# Patient Record
Sex: FEMALE | Race: WHITE | Employment: OTHER | URBAN - METROPOLITAN AREA
[De-identification: names, ages, dates, MRNs, and addresses within clinical notes are randomized per-mention and may not be internally consistent; named-entity substitution may affect disease eponyms.]

---

## 2020-03-09 ENCOUNTER — HOSPITAL ENCOUNTER (OUTPATIENT)
Dept: ULTRASOUND IMAGING | Age: 75
Discharge: HOME | End: 2020-03-09
Attending: ORTHOPAEDIC SURGERY
Payer: MEDICARE

## 2020-03-09 DIAGNOSIS — R93.5 ABNORMAL FINDINGS ON DIAGNOSTIC IMAGING OF OTHER ABDOMINAL REGIONS, INCLUDING RETROPERITONEUM: ICD-10-CM

## 2020-03-09 DIAGNOSIS — M47.26 OTHER SPONDYLOSIS WITH RADICULOPATHY, LUMBAR REGION: ICD-10-CM

## 2020-03-09 PROCEDURE — 93975 VASCULAR STUDY: CPT

## 2020-03-19 NOTE — PROGRESS NOTES
[facility]  [address] [place] [address], [address], [address] [facility]  [address] [facility], [address] [facility]  [place], [address] [facility]542 [phone] [phone]  Guillermina Leija DO      Patient ID:  Name:  Fozia Machado  MRN:  [id number]  :  1945/74 y.o. Date:  3/24/2020      HISTORY OF PRESENT ILLNESS:  Fozia Machado is a 69 y.o.  postmenopausal female referred by Dr. Jaja Mehta for pelvic mass. Has h/o back pain s/p epidural steroid injection She was seen for workup of groin pain, s/p TVUS which demonstrated a nonspecific soft tissueu mass 1.7cm in the lower uterine segment near the cervix. She c/o burning in vulva. Has seen derm in past but not on any treatment. Also has intermittent anterior thigh pain worse when stanging. She presents today for further management. Denies vaginal bleeding. Pt denies any h/o abnormal paps  Labs:  none      Imaging:  3/10/2020  TVUS  Scanned in media  IMPRESSION:  Nonspecific sort tissue 1.7cm mass present in the lower uterine segment/cervix which is abutting the endometrium. This could represent a low uterine fibroid, or a cervical mass. Tissue sampling may be indicated to r/o malignancy. Right ovary demonstrates normal arterial and venous flow. Left ovary obscured by bowel gas.        ROS:   As above      Patient Active Problem List    Diagnosis Date Noted    Severe obesity ([address] 2020     Past Medical History:   Diagnosis Date    Hypercholesterolemia     Hypertension       Past Surgical History:   Procedure Laterality Date    BREAST SURGERY PROCEDURE UNLISTED Left     lump removal    HX COLONOSCOPY        OB History        2    Para        Term                AB        Living   2       SAB        TAB        Ectopic        Molar        Multiple        Live Births                  Social History     Tobacco Use    Smoking status: Former Smoker    Smokeless tobacco: Never Used   Substance Use Topics  Alcohol use: Yes     Comment: 2 per month/social      Family History   Problem Relation Age of Onset    Cancer Maternal Grandmother         lung      Current Outpatient Medications   Medication Sig    atorvastatin (LIPITOR) 10 mg tablet TAKE 1 TABLET EVERY NIGHT AT BEDTIME    multivitamin (ONE A DAY) tablet Take 1 Tab by mouth daily.  ergocalciferol (ERGOCALCIFEROL) 1,250 mcg (50,000 unit) capsule Take 50,000 Units by mouth every seven (7) days.  clobetasoL (TEMOVATE) 0.05 % topical cream Apply  to affected area nightly. No current facility-administered medications for this visit. Allergies   Allergen Reactions    Latex Rash    Penicillins Hives          OBJECTIVE:    Physical Exam  VITAL SIGNS: Visit Vitals  /65 (BP 1 Location: Left arm, BP Patient Position: Sitting)   Pulse 70   Temp 97.3 °F (36.3 °C) (Oral)   Resp 14   Ht 5' 6.5\" (1.689 m)   Wt 103.9 kg (229 lb)   SpO2 98%   BMI 36.41 kg/m²      GENERAL BREANNA: in no apparent distress and well developed and well nourished   MUSCULOSKEL: no joint tenderness, deformity or swelling   INTEGUMENT:  warm and dry, no rashes or lesions   ABDOMEN . soft, NT, ND, No masses appreciated   EXTREMITIES: extremities normal, atraumatic, no cyanosis or edema   PELVIC: External genitalia: obliteration of normal anatomy with erythema c/w lichen sclerosis  Vaginal: atrophic mucosa  Cervix: normal appearance  Adnexa: normal bimanual exam  Uterus: normal single, nontender   RECTAL: deferred   ROVERTO SURVEY: Cervical, supraclavicular, axillary and inguinal nodes normal.   NEURO: Grossly normal          IMPRESSION/PLAN:  1. LIchen sclerosis   -no need for biopsy at this time   -pt info regarding vulvar hygiene given to patient   -will have her use clobetasol nightly for 3 wks and f/u in office    2. ? Cervical mass   -explained given size it is unlikely to be causing symptoms given normal exam. Will get pelvic MRI to better assess      The total time spent was 60 minutes regarding this patients diagnosis of lichen sclerosis, cervical mass and >50% of this time was spent counseling and coordinating care    [address] [place] [address]  Gynecologic Oncology  3/24/365517:21 PM

## 2020-03-24 ENCOUNTER — TELEPHONE (OUTPATIENT)
Dept: ONCOLOGY | Age: 75
End: 2020-03-24

## 2020-03-24 ENCOUNTER — OFFICE VISIT (OUTPATIENT)
Dept: ONCOLOGY | Age: 75
End: 2020-03-24

## 2020-03-24 VITALS
WEIGHT: 229 LBS | BODY MASS INDEX: 35.94 KG/M2 | RESPIRATION RATE: 14 BRPM | HEIGHT: 67 IN | DIASTOLIC BLOOD PRESSURE: 65 MMHG | OXYGEN SATURATION: 98 % | SYSTOLIC BLOOD PRESSURE: 148 MMHG | HEART RATE: 70 BPM | TEMPERATURE: 97.3 F

## 2020-03-24 DIAGNOSIS — N88.8 CERVICAL MASS: Primary | ICD-10-CM

## 2020-03-24 DIAGNOSIS — E66.01 SEVERE OBESITY (HCC): ICD-10-CM

## 2020-03-24 RX ORDER — CLOBETASOL PROPIONATE 0.5 MG/G
CREAM TOPICAL
Qty: 30 G | Refills: 3 | Status: SHIPPED | OUTPATIENT
Start: 2020-03-24 | End: 2021-11-16 | Stop reason: SDUPTHER

## 2020-03-24 RX ORDER — BISMUTH SUBSALICYLATE 262 MG
1 TABLET,CHEWABLE ORAL DAILY
COMMUNITY

## 2020-03-24 RX ORDER — ERGOCALCIFEROL 1.25 MG/1
50000 CAPSULE ORAL
COMMUNITY
Start: 2019-05-14

## 2020-03-24 RX ORDER — ATORVASTATIN CALCIUM 10 MG/1
TABLET, FILM COATED ORAL
COMMUNITY
Start: 2019-05-14

## 2020-03-24 NOTE — PATIENT INSTRUCTIONS
MRI of the Pelvis: About This Test 
What is it? MRI (magnetic resonance imaging) is a test that uses a magnetic field and pulses of radio wave energy to make pictures of the organs and structures inside the body. An MRI of the pelvis can give the doctor information about a woman's uterus, ovaries, and fallopian tubes. The scan is sometimes used to check a man's prostate and seminal vesicles. It also can check the rectum and anal area. When you have an MRI, you lie on a table and the table moves into the MRI machine. Why is this test done? An MRI of the pelvis can help find problems such as tumors in the ovaries, uterus, prostate, rectum, and anus. It also can be used to look for an anal fistula (a tube-shaped passage from the anal canal to a hole in the skin near the anus) and look for the cause of pelvic pain in women, such as endometriosis. How do you prepare for the test? 
In general, there's nothing you have to do before this test, unless your doctor tells you to. Tell your doctor if you get nervous in tight spaces. You may get a medicine to help you relax. If you think you'll get this medicine, be sure you have someone to take you home. For some pelvic MRI tests, you may be asked to not eat or drink for several hours before the test. 
How is the test done? Before the test 
· You will remove all metal objects, such as hearing aids, dentures, jewelry, watches, and hairpins. · You will take off all or most of your clothes and change into a gown. If you do leave some clothes on, make sure you take everything out of your pockets. · You may have contrast material (dye) put into your arm through a tube called an IV. Contrast material helps doctors see specific organs, blood vessels, and most tumors. During the test 
· You will lie on a table that's part of the MRI scanner. · The table will slide into the space that contains the magnet. · Inside the scanner, you will hear a fan and feel air moving. You may hear tapping, thumping, or snapping noises. You may be given earplugs or headphones to reduce the noise. · You will be asked to hold still during the scan. You may be asked to hold your breath for short periods. · You may be alone in the scanning room. But a technologist will watch through a window and talk with you during the test. 
How is the test done? · You may have contrast material (dye) put into your arm through a tube called an IV. · You will lie on a table that's part of the MRI scanner. · The table will slide into the space that contains the magnet. · Inside the scanner, you will hear a fan and feel air moving. You may hear tapping, thumping, or snapping noises. You may be given earplugs or headphones to reduce the noise. · You will be asked to hold still during the scan. You may be asked to hold your breath for short periods. · You may be alone in the scanning room. But a technologist will watch through a window and talk with you during the test. 
How does having an MRI of the pelvis feel? You won't have pain from the magnetic field or radio waves used for the MRI test. But you may be tired or sore from lying in one position for a long time. If a contrast material is used, you may feel some coolness when it is put into your IV. In rare cases, you may feel: · Tingling in the mouth if you have metal dental fillings. · Warmth in the area being checked. This is normal. Tell the technologist if you have nausea, vomiting, a headache, dizziness, pain, burning, or breathing problems. How long does the test take? The test usually takes 30 to 60 minutes but can take as long as 2 hours. What happens after the test? 
· You will probably be able to go home right away. It depends on the reason for the test. 
· You can go back to your usual activities right away. Follow-up care is a key part of your treatment and safety. Be sure to make and go to all appointments, and call your doctor if you are having problems. It's also a good idea to keep a list of the medicines you take. Ask your doctor when you can expect to have your test results. Where can you learn more? Go to http://alvaro-jose.info/ Enter H105 in the search box to learn more about \"MRI of the Pelvis: About This Test.\" Current as of: December 8, 2019Content Version: 12.4 © 3261-1940 [employer]orporated. Care instructions adapted under license by [employer] (which disclaims liability or warranty for this information). If you have questions about a medical condition or this instruction, always ask your healthcare professional. [address] any warranty or liability for your use of this information.

## 2020-03-24 NOTE — PROGRESS NOTES
Pili Watson is a 69 y.o. female presents in office for new patient visit, referred by Dr. Jackie Bautista. Chief Complaint   Patient presents with    New Patient     groin pain      Pt reports seeing dermatology for clitoris 'turning inward'. Do you have any unusual vaginal bleeding, discharge or irritation? No  Do you have any changes in your bowel movements? Pt c/o occasional constipation. Have you been experiencing nausea or vomiting? No  Have you been experiencing any continuous or worsening abdominal pain? Pt c/o 8/10 groin pain. Any urinary burning? Pt c/o night time urinary frequency. Visit Vitals  /65 (BP 1 Location: Left arm, BP Patient Position: Sitting)   Pulse 70   Temp 97.3 °F (36.3 °C) (Oral)   Resp 14   Ht 5' 6.5\" (1.689 m)   Wt 103.9 kg (229 lb)   SpO2 98%   BMI 36.41 kg/m²         Health Maintenance Due   Topic Date Due    Hepatitis C Screening  1945    Lipid Screen  11/29/1957    Shingrix Vaccine Age 50> (1 of 2) 11/29/1997    Breast Cancer Screen Mammogram  11/29/1997    FOBT Q1Y Age 50-75  11/29/1997    GLAUCOMA SCREENING Q2Y  11/29/2012    Bone Densitometry (Dexa) Screening  11/29/2012    Medicare Yearly Exam  03/19/2020         1. Have you been to the ER, urgent care clinic since your last visit? Hospitalized since your last visit? Pt was seen in [place] [facility] 1 month ago for stomach virus    2. Have you seen or consulted any other health care providers outside of the [address] [place] [address] since your last visit? Include any pap smears or colon screening. No    3 most recent PHQ Screens 3/24/2020   Little interest or pleasure in doing things Not at all   Feeling down, depressed, irritable, or hopeless Not at all   Total Score PHQ 2 0       Fall Risk Assessment, last 12 mths 3/24/2020   Able to walk? Yes   Fall in past 12 months?  No       Learning Assessment 3/24/2020   PRIMARY LEARNER Patient   BARRIERS PRIMARY LEARNER NONE   CO-LEARNER CAREGIVER No   PRIMARY LANGUAGE ENGLISH   LEARNER PREFERENCE PRIMARY DEMONSTRATION   ANSWERED BY Patient   RELATIONSHIP SELF

## 2020-04-03 ENCOUNTER — HOSPITAL ENCOUNTER (OUTPATIENT)
Dept: MRI IMAGING | Age: 75
Discharge: HOME | End: 2020-04-03
Attending: OBSTETRICS & GYNECOLOGY
Payer: MEDICARE

## 2020-04-03 DIAGNOSIS — N88.8 CERVICAL MASS: ICD-10-CM

## 2020-04-03 PROCEDURE — 74011636320 HC RX REV CODE- 636/320: Performed by: OBSTETRICS & GYNECOLOGY

## 2020-04-03 PROCEDURE — A9575 INJ GADOTERATE MEGLUMI 0.1ML: HCPCS | Performed by: OBSTETRICS & GYNECOLOGY

## 2020-04-03 PROCEDURE — 72197 MRI PELVIS W/O & W/DYE: CPT

## 2020-04-03 RX ADMIN — GADOTERATE MEGLUMINE 20 ML: 376.9 INJECTION INTRAVENOUS at 11:46

## 2020-04-14 ENCOUNTER — OFFICE VISIT (OUTPATIENT)
Dept: ONCOLOGY | Age: 75
End: 2020-04-14

## 2020-04-14 VITALS
HEART RATE: 68 BPM | OXYGEN SATURATION: 99 % | HEIGHT: 66 IN | TEMPERATURE: 97.1 F | RESPIRATION RATE: 14 BRPM | SYSTOLIC BLOOD PRESSURE: 153 MMHG | DIASTOLIC BLOOD PRESSURE: 65 MMHG | BODY MASS INDEX: 37.45 KG/M2 | WEIGHT: 233 LBS

## 2020-04-14 DIAGNOSIS — L90.0 LICHEN SCLEROSUS ET ATROPHICUS: Primary | ICD-10-CM

## 2020-04-14 NOTE — LETTER
4/14/20 Patient: Rhea Howard YOB: 1945 Date of Visit: 4/14/2020 Keerthi Kamara MD 
[address] [place] [address] VIA Facsimile: [phone] Dear Keerthi Kamara MD, [facility]. My notes for this consultation are attached. If you have questions, please do not hesitate to call me. I look forward to following your patient along with you. Sincerely, William Fall MD

## 2020-04-14 NOTE — PROGRESS NOTES
Josephine Hamlin is a 69 y.o. female presents in office for follow up for review of results. Patient states has no current pain. Patient level of pain 0 out of 10. Do you have any unusual vaginal bleeding, discharge or irritation? No     Do you have any changes in your bowel movements? No     Have you been experiencing any continuous or worsening abdominal pain? No     .  Visit Vitals  /65 (BP 1 Location: Left arm, BP Patient Position: Sitting)   Pulse 68   Temp 97.1 °F (36.2 °C) (Oral)   Resp 14   Ht 5' 6\" (1.676 m)   Wt 233 lb (105.7 kg)   SpO2 99%   BMI 37.61 kg/m²         Health Maintenance Due   Topic Date Due    Hepatitis C Screening  1945    Lipid Screen  11/29/1957    Shingrix Vaccine Age 50> (1 of 2) 11/29/1997    Breast Cancer Screen Mammogram  11/29/1997    FOBT Q1Y Age 50-75  11/29/1997    GLAUCOMA SCREENING Q2Y  11/29/2012    Bone Densitometry (Dexa) Screening  11/29/2012    Medicare Yearly Exam  03/19/2020         1. Have you been to the ER, urgent care clinic since your last visit? Hospitalized since your last visit? March 2020 Virus    2. Have you seen or consulted any other health care providers outside of the [address] [place] [address] since your last visit? Include any pap smears or colon screening. No     3 most recent PHQ Screens 3/24/2020   Little interest or pleasure in doing things Not at all   Feeling down, depressed, irritable, or hopeless Not at all   Total Score PHQ 2 0       No flowsheet data found. Fall Risk Assessment, last 12 mths 3/24/2020   Able to walk? Yes   Fall in past 12 months?  No       Learning Assessment 3/24/2020   PRIMARY LEARNER Patient   BARRIERS PRIMARY LEARNER NONE   CO-LEARNER CAREGIVER No   PRIMARY LANGUAGE ENGLISH   LEARNER PREFERENCE PRIMARY DEMONSTRATION   ANSWERED BY Patient   RELATIONSHIP SELF

## 2020-10-05 ENCOUNTER — TELEPHONE (OUTPATIENT)
Dept: ONCOLOGY | Age: 75
End: 2020-10-05

## 2020-10-05 ENCOUNTER — OFFICE VISIT (OUTPATIENT)
Dept: ONCOLOGY | Age: 75
End: 2020-10-05
Payer: MEDICARE

## 2020-10-05 VITALS
HEIGHT: 66 IN | TEMPERATURE: 97.8 F | WEIGHT: 236 LBS | RESPIRATION RATE: 16 BRPM | HEART RATE: 73 BPM | SYSTOLIC BLOOD PRESSURE: 144 MMHG | DIASTOLIC BLOOD PRESSURE: 65 MMHG | BODY MASS INDEX: 37.93 KG/M2 | OXYGEN SATURATION: 94 %

## 2020-10-05 DIAGNOSIS — L90.0 LICHEN SCLEROSUS ET ATROPHICUS: Primary | ICD-10-CM

## 2020-10-05 DIAGNOSIS — R10.2 VULVOVAGINAL PAIN: ICD-10-CM

## 2020-10-05 PROCEDURE — G8432 DEP SCR NOT DOC, RNG: HCPCS | Performed by: OBSTETRICS & GYNECOLOGY

## 2020-10-05 PROCEDURE — 3017F COLORECTAL CA SCREEN DOC REV: CPT | Performed by: OBSTETRICS & GYNECOLOGY

## 2020-10-05 PROCEDURE — G8400 PT W/DXA NO RESULTS DOC: HCPCS | Performed by: OBSTETRICS & GYNECOLOGY

## 2020-10-05 PROCEDURE — 1101F PT FALLS ASSESS-DOCD LE1/YR: CPT | Performed by: OBSTETRICS & GYNECOLOGY

## 2020-10-05 PROCEDURE — G0463 HOSPITAL OUTPT CLINIC VISIT: HCPCS | Performed by: OBSTETRICS & GYNECOLOGY

## 2020-10-05 PROCEDURE — G8427 DOCREV CUR MEDS BY ELIG CLIN: HCPCS | Performed by: OBSTETRICS & GYNECOLOGY

## 2020-10-05 PROCEDURE — 99214 OFFICE O/P EST MOD 30 MIN: CPT | Performed by: OBSTETRICS & GYNECOLOGY

## 2020-10-05 PROCEDURE — G8536 NO DOC ELDER MAL SCRN: HCPCS | Performed by: OBSTETRICS & GYNECOLOGY

## 2020-10-05 PROCEDURE — 1090F PRES/ABSN URINE INCON ASSESS: CPT | Performed by: OBSTETRICS & GYNECOLOGY

## 2020-10-05 PROCEDURE — G8417 CALC BMI ABV UP PARAM F/U: HCPCS | Performed by: OBSTETRICS & GYNECOLOGY

## 2020-10-05 RX ORDER — SENNOSIDES 25 MG/1
TABLET, FILM COATED ORAL
Qty: 45 G | Refills: 2 | Status: SHIPPED | OUTPATIENT
Start: 2020-10-05

## 2020-10-05 RX ORDER — FLUCONAZOLE 150 MG/1
150 TABLET ORAL DAILY
Qty: 1 TAB | Refills: 0 | Status: SHIPPED | OUTPATIENT
Start: 2020-10-05 | End: 2020-10-06

## 2020-10-05 NOTE — LETTER
10/5/20 Patient: Percy Thornton YOB: 1945 Date of Visit: 10/5/2020 Erika Leonardo MD 
[address]  [place] [address] VIA Facsimile: [phone] Dear Erika Leonardo MD, Thank you for referring Ms. Percy Thornton to [facility] for evaluation. My notes for this consultation are attached. If you have questions, please do not hesitate to call me. I look forward to following your patient along with you. Sincerely, Ford Brantley MD

## 2020-10-05 NOTE — PROGRESS NOTES
Jin Soto is a 69 y.o. female presents in office for Lichen Sclerosus surveillance. Chief Complaint   Patient presents with    Follow-up     Lichen Sclerosus        Do you have any unusual vaginal bleeding, discharge or irritation? No  Do you have any changes in your bowel movements? No  Have you been experiencing nausea or vomiting? No  Have you been experiencing any continuous or worsening abdominal pain? Pt c/o burning and aching pain in pelvic area at night, worsens if she stands a lot. Any urinary burning? No    Visit Vitals  BP (!) 144/65 (BP 1 Location: Left arm, BP Patient Position: Sitting)   Pulse 73   Temp 97.8 °F (36.6 °C) (Oral)   Resp 16   Ht 5' 6\" (1.676 m)   Wt 107 kg (236 lb)   SpO2 94%   BMI 38.09 kg/m²         1. Have you been to the ER, urgent care clinic since your last visit? Hospitalized since your last visit? No    2. Have you seen or consulted any other health care providers outside of the [address] [place] [address] since your last visit? Include any pap smears or colon screening. No    3 most recent PHQ Screens 3/24/2020   Little interest or pleasure in doing things Not at all   Feeling down, depressed, irritable, or hopeless Not at all   Total Score PHQ 2 0         Fall Risk Assessment, last 12 mths 3/24/2020   Able to walk? Yes   Fall in past 12 months?  No       Learning Assessment 3/24/2020   PRIMARY LEARNER Patient   BARRIERS PRIMARY LEARNER NONE   CO-LEARNER CAREGIVER No   PRIMARY LANGUAGE ENGLISH   LEARNER PREFERENCE PRIMARY DEMONSTRATION   ANSWERED BY Patient   RELATIONSHIP SELF

## 2020-10-05 NOTE — PATIENT INSTRUCTIONS
Lichen Sclerosus: Care Instructions Your Care Instructions Lichen sclerosus is a long-term (chronic) skin problem that causes thin, wrinkled white patches. The patches are itchy and painful. If the skin tears, bright red or purple spots may appear. In most cases, it occurs on the skin of the anus (the opening where stool leaves the body), the vulva (the area around the vagina), and the tip of the penis in men who haven't been circumcised. Doctors aren't sure what causes lichen sclerosus. It isn't caused by an infection, and it's not contagious. You can't spread it to others. If the skin patches are on the anus, vulva, or penis, they may need to be treated. If these areas aren't treated, the skin can thicken and scar. This can narrow the openings to the vagina and anus. The foreskin over the penis may tighten and shrink. If this happens, going to the bathroom and having sex can be painful. Lichen sclerosus is usually treated with strong prescription cream or ointment. The medicine stops the inflammation, but the scarring of the skin might not completely go away. Men with scarring from advanced cases on the tip of the penis may have surgery to remove the foreskin. Skin patches on any other part of the body usually go away on their own without treatment. You may have a small increased risk of skin cancer on the affected area. Your doctor will examine the skin at least once a year. Follow-up care is a key part of your treatment and safety. Be sure to make and go to all appointments, and call your doctor if you are having problems. It's also a good idea to know your test results and keep a list of the medicines you take. How can you care for yourself at home? · Be safe with medicines. If your doctor prescribed a cream, apply it exactly as directed. Call your doctor if you think you are having a problem with your medicine. · Put cold, wet cloths on the area to reduce itching. · Wear loose-fitting clothes. Avoid nylon and other fabric that holds moisture close to the skin. This may allow an infection to start. · If your doctor told you to use nonprescription moisturizing cream on your skin, read and follow the directions on the label. Care tips for women · Do not douche, unless your doctor tells you to. · Avoid hot baths. Don't use soaps or bath products to wash the area around your vulva. Rinse with water only, and gently pat the area dry. Care tips for men · Keep your penis clean. If you haven't been circumcised, gently pull the foreskin back to wash your penis with warm water. Make sure your penis is dry before you get dressed. When should you call for help? Call your doctor now or seek immediate medical care if: 
  · You have symptoms of infection, such as: 
? Increased pain, swelling, warmth, or redness. ? Red streaks leading from the area. ? Pus draining from the area. ? A fever. Watch closely for changes in your health, and be sure to contact your doctor if: 
  · The affected area grows or changes.  
  · You do not get better as expected. Where can you learn more? Go to http://www.gray.com/ Enter P548 in the search box to learn more about \"Lichen Sclerosus: Care Instructions. \" Current as of: July 2, 2020               Content Version: 12.6 © 5196-7864 [employer]. Care instructions adapted under license by [employer] (which disclaims liability or warranty for this information). If you have questions about a medical condition or this instruction, always ask your healthcare professional. [place] [address] any warranty or liability for your use of this information. Vulvar Pain: Care Instructions Your Care Instructions The vulva is the area around the opening of the vagina.  The cause of vulvar pain (vulvodynia) is not always clear, but it may include inflamed nerves, allergies, skin diseases, diabetes, or infection. You may have pain just in the vulva, or it may reach to the rectal area or legs. Vulvar pain can flare up with activities such as sitting on a bicycle, having sex, or inserting a tampon. Follow-up care is a key part of your treatment and safety. Be sure to make and go to all appointments, and call your doctor if you are having problems. It's also a good idea to know your test results and keep a list of the medicines you take. How can you care for yourself at home? · Take your medicines exactly as prescribed. Call your doctor if you think you are having a problem with your medicine. · Relieve itching with a cold water compress or cool baths. Do not scratch the area. · Wear loose-fitting, cotton clothes. Avoid nylon and other fabric that holds moisture close to the skin. This may allow an infection to start. · Do not douche, unless your doctor tells you to. · Limit exercise that can irritate the vulva, such as bike riding or horseback riding. · Avoid hot baths, and do not use soaps or bath products to wash your vulvar area. Rinse with water only, and gently pat the area dry. When should you call for help? Call your doctor now or seek immediate medical care if: 
  · You have a new or higher fever.  
  · You have unusual vaginal bleeding.  
  · You have new or worse belly or pelvic pain.  
  · You have vaginal discharge that has increased in amount or smells bad. Watch closely for changes in your health, and be sure to contact your doctor if: 
  · You do not get better as expected. Where can you learn more? Go to http://www.gray.com/ Enter 0490 [id number] 81 in the search box to learn more about \"Vulvar Pain: Care Instructions. \" Current as of: November 8, 2019               Content Version: 12.6 © 0841-9724 [employer].   
Care instructions adapted under license by [employer] (which disclaims liability or warranty for this information). If you have questions about a medical condition or this instruction, always ask your healthcare professional. [address] any warranty or liability for your use of this information.

## 2020-10-05 NOTE — PROGRESS NOTES
[address] [facility]  [address] [place] [address], [address], [address] [place]  [address] [facility], [address] [place], [place] [address] Way  [place], [address] [facility]  [phone]  7 [facility]        Patient ID:  Name:  Lulu Tidwell  MRN:  [id number]  :  1945/74 y.o. Date:  10/5/2020      HISTORY OF PRESENT ILLNESS:  Lulu Tidwell is a 69 y.o.  postmenopausal female referred by Dr. Stiven Cross for pelvic mass. Has h/o back pain s/p epidural steroid injection She was seen for workup of groin pain, s/p TVUS which demonstrated a nonspecific soft tissue mass 1.7cm in the lower uterine segment near the cervix, MRI previously reviewed and was negative. Patient has seen derm in past but not on any treatment. Also has intermittent anterior thigh pain worse when standing. She states that vulvovaginal pain is intermittent and has been ongoing for quite some time. She states that clobetasol cream does not help. She denies itching. She is sexually active occasionally, but admits to significant dyspareunia. She describes the pain as burning, throbbing, vibrating pain. Denies alleviating factors, but states that pain is worse at night after increased activity/standing, when she can finally relax. The pain wakes her at night. She denies vaginal discharge or bleeding. She denies personal or family h/o DVT, stroke, breast cancer. Labs:  none      Imaging:  MRI 4/3/2020  FINDINGS:     UTERUS:  Orientation: Retroverted  Size: 8.0 x 3.0 x 5.2 cm  Masses: 2 small calcified fibroids are present which correspond with the  calcifications seen on ultrasound 3/9/2020. Endometrium: Normal.   Junctional zone: Normal.  Cervix: Normal.     ADNEXA:  Unremarkable.     OTHER:  Colonic diverticulosis is present.      ========     IMPRESSION  IMPRESSION:     Cervical mass is not noted.  Focal hyperechoic myometrial area seen on ultrasound  3/9/2023 corresponds with redundant myometrium related to the retroverted  uterus. 3/10/2020  TVUS  Scanned in media  IMPRESSION:  Nonspecific sort tissue 1.7cm mass present in the lower uterine segment/cervix which is abutting the endometrium. This could represent a low uterine fibroid, or a cervical mass. Tissue sampling may be indicated to r/o malignancy. Right ovary demonstrates normal arterial and venous flow. Left ovary obscured by bowel gas. ROS:   As above      Patient Active Problem List    Diagnosis Date Noted    Severe obesity ([address] 2020     Past Medical History:   Diagnosis Date    Hypercholesterolemia     Hypertension       Past Surgical History:   Procedure Laterality Date    BREAST SURGERY PROCEDURE UNLISTED Left     lump removal    HX COLONOSCOPY        OB History        2    Para        Term                AB        Living   2       SAB        TAB        Ectopic        Molar        Multiple        Live Births                  Social History     Tobacco Use    Smoking status: Former Smoker    Smokeless tobacco: Never Used   Substance Use Topics    Alcohol use: Yes     Comment: 2 per month/social      Family History   Problem Relation Age of Onset    Cancer Maternal Grandmother         lung      Current Outpatient Medications   Medication Sig    atorvastatin (LIPITOR) 10 mg tablet TAKE 1 TABLET EVERY NIGHT AT BEDTIME    multivitamin (ONE A DAY) tablet Take 1 Tab by mouth daily.  ergocalciferol (ERGOCALCIFEROL) 1,250 mcg (50,000 unit) capsule Take 50,000 Units by mouth every seven (7) days.  clobetasoL (TEMOVATE) 0.05 % topical cream Apply  to affected area nightly. No current facility-administered medications for this visit.       Allergies   Allergen Reactions    Latex Rash    Penicillins Hives          OBJECTIVE:    Physical Exam  VITAL SIGNS: Visit Vitals  BP (!) 144/65 (BP 1 Location: Left arm, BP Patient Position: Sitting)   Pulse 73   Temp 97.8 °F (36.6 °C) (Oral)   Resp 16 Ht 5' 6\" (1.676 m)   Wt 107 kg (236 lb)   SpO2 94%   BMI 38.09 kg/m²      GENERAL BREANNA: in no apparent distress and well developed and well nourished   MUSCULOSKEL: no joint tenderness, deformity or swelling   INTEGUMENT:  warm and dry, no rashes or lesions   ABDOMEN . soft, NT, ND, No masses appreciated   EXTREMITIES: extremities normal, atraumatic, no cyanosis or edema   PELVIC: External genitalia: obliteration of normal anatomy with erythema c/w lichen sclerosis  Vaginal: atrophic mucosa, inflammed, no discharge, lesions  Cervix: normal appearance  Adnexa: normal bimanual exam  Uterus: normal single, nontender   RECTAL: deferred   ROVERTO SURVEY: Cervical, supraclavicular, axillary and inguinal nodes normal.   NEURO: Grossly normal          IMPRESSION/PLAN:  1. Lichen sclerosis   -no need for biopsy at this time   -pt info regarding vulvar hygiene given to patient   -will plan for continued use of clobetasol 3x/wk   -ok to f/u in 6 months or if symptoms worsen    2. 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 Discussed treatment options, including pelvic floor PT, vaginal estrogen, topical lidocaine PRN. Reviewed recommendations in detail, including dosing and use. Will f/u in 3 months to reassess. Patient to call sooner PRN. May warrant referral to specialist if no improvement, patient expressed understanding.     3. ? Cervical mass   -previously reviewed MRI with no mass just retroverted uterus         The total time spent was 25 minutes regarding this patients diagnosis of lichen sclerosis, cervical mass and >50% of this time was spent counseling and coordinating care    [facility]  Gynecologic Oncology  10/5/701966:21 PM

## 2020-10-27 ENCOUNTER — HOSPITAL ENCOUNTER (OUTPATIENT)
Dept: PHYSICAL THERAPY | Age: 75
End: 2020-10-27
Payer: MEDICARE

## 2020-10-27 ENCOUNTER — HOSPITAL ENCOUNTER (OUTPATIENT)
Dept: PHYSICAL THERAPY | Age: 75
Discharge: HOME | End: 2020-10-27
Payer: MEDICARE

## 2020-10-27 PROCEDURE — 97162 PT EVAL MOD COMPLEX 30 MIN: CPT

## 2020-10-27 PROCEDURE — 97535 SELF CARE MNGMENT TRAINING: CPT

## 2020-10-27 NOTE — PROGRESS NOTES
Physical Therapy Evaluation        Patient Name: Kate Khan  Date:10/27/2020  : 1945  [x]  Patient  Verified  Payor: Grace Orozco / [place] / Product Type: Medicare /    In time:1245  Out time:130  Total Treatment Time (min): 45  Visit #: 1 of 16    Medicare/BCBS Only   Total Timed Codes (min):  10 1:1 Treatment Time:  55       Treatment Area: Other specified conditions associated with female genital organs and menstrual cycle [N94.89]    SUBJECTIVE  Pain Level (0-10 scale): 4/10 vaginal ache/burn  []constant []intermittent []improving []worsening []no change since onset    Any medication changes, allergies to medications, adverse drug reactions, diagnosis change, or new procedure performed?: [x] No    [] Yes (see summary sheet for update)  Subjective functional status/changes:     PLOF: Prior to menopause no vaginal or urinary pain or issues  Limitations to PLOF: Decreased strength, coordination and endurance, poor behavioral and dietary habits for bladder health  Mechanism of Injury: since menopause, around 15-20 years ago  Current symptoms/Complaints: Patient c/o vaginal pain that wakes her up at night several nights/week and randomly throughout the day, describing the sensation as \"if it falls asleep or if you sat on a bike seat too hard, and then aches afterwards. \" Patient reports also having night time UI at night primarily walking to toilet. Reports pain with intercourse for the last couple of years, unable to engage; also pain with annual exams. *Patient expressing that she feels her body \"shifted and felt a clunk\" right after her son committed suicide 22 years ago.   Previous Treatment/Compliance: none  PMHx/Surgical Hx: lichen sclerosis, 2 vaginal deliveries; topical estrogen (has not started)  and Clobetasol   Work Hx: retired  Living Situation: spouse  Pt Goals: Relief of pain and more control over night time urinary issues  Barriers: []pain []financial []time []transportation []other  Motivation: high  Substance use: [x]Alcohol []Tobacco []other:   Cognition: A & O x 4    Other:    Current urinary complaint  leaks with urgency  urge incontinence  at night primarily    Bladder complaint longevity:  3 - 5 years    Bladder symptom progression:  worsened    Frequency of UI: 2 times per night    Pad use:  incontinence pads: 2- 2 per day    Pad wetness when changed:  wet    Daytime urinary frequency:  Every 4+ hour(s) during the day    Nocturia:  2-3x/ night    Patient has failed previous pelvic floor muscle training? [] Yes    [x] No    Bowel function:  Regular BM, 5-7 per week, every other day  Stool Type ([place]): 2  Toileting position/modifications: standard    Fecal Incontinence present? none    Pain complaint:  vaginal pain: deep, opening (interoitus), sharp, stabbing, aching, during intercourse and at night during sleep, intercourse   Vaginal pain waking pt up 3x/week and during the day randomly     Pain scale:  Verbal Analog scale: average daily pain 3, best day 0, worst pain 8    Pain description:  worsens with: patient cannot identify any precipitating factors and intercourse    Diet: \"I love sugar\" and bread    Fluid intake:    Fluid  Amount per day   Water 32-48 oz/day   Caffeine Decaf tea AM, 1 cup   Alcohol Occassional, 4x/month   Diet coke Recently, 1 can/day         Physical Exam Objective Findings:  Due to complexity of subjective report and time constraints, limited objective taken today. PFM assessment will be performed in next 1-2 sessions.     AROM  Lumbar Left Right   Flex Fingertips to toes, slow with min pain in lumbar *c/o UI with stand    Ext WFL, min pain and reports \"stiffness\"    Side bend Decreased 25 % Decreased 25%   Rotation Decreased 25% Decreased 50%, min pain          Strength (MMT):                                            Hip L (1-5) R (1-5)   Hip Flexion 4- 4-   Hip Ext 4 4   Hip ABD 4- 4-   Hip ADD 4 4       Functional Mobility Transfers:       Sit-Stand: 5 times STS without UE support, using Bilat hands on quads for support    Pelvic Floor Assessment  Patient was educated on pelvic floor anatomy, structure, and function and implications for current presentation of s/s. Patient consents to pelvic floor assessment next session. Postural assessment:  sitting with rounded shoulders, hyperextension of cervical spine  stance with hyperlordotic lumbar spine    Range of Motion:  Patient with significant low back ROM restriction and accommodates with postural and gait changes         30 min [x]Eval                  []Re-Eval       15 min Self Care: Review and handout provided on the following: PFM anatomy, structure and function as it pertains to current s/s, complaints and condition. Reviewed expectations for PFPT and POC. Discussed potential of trauma to manifest in lingering dysfunction as it relates to chronic pain; encourage pt to express and release as things come up in session  GOALS until next session: minimum 48 oz water/day  Limit fluid intake 2-3 hours before bed  No diet soda   Rationale:  Increase awareness and understanding of current condition to improve patients ability to independently and effectively attain goals and progress towards long term management of current condition. With   [] TE   [] TA   [] neuro   [] other: Patient Education: [x] Review HEP    [] Progressed/Changed HEP based on:   [] positioning   [] body mechanics   [] transfers   [] heat/ice application    [] other:           Pain Level (0-10 scale) post treatment: 4    ASSESSMENT/Changes in Function: Patient is a 69year old female who presents to Physical Therapy with c/o pelvic pain and urinary incontinence. Patient presents with ROM restrictions and decreased strength.  Patient also presents with s/s consistent with tightness and tenderness of the pelvic floor muscles, and decreased ability to contract, relax, and elongate the pelvic floor muscles; PFM assessment will be performed in next 1-2 sessions. I feel patient will benefit from skilled therapeutic intervention to improve their ability to function at highest level possible. Patient will continue to benefit from skilled PT services to modify and progress therapeutic interventions, address functional mobility deficits, address ROM deficits, address strength deficits, analyze and address soft tissue restrictions, analyze and cue movement patterns, analyze and modify body mechanics/ergonomics and assess and modify postural abnormalities to attain remaining goals.      [x]  See Plan of Care  []  See progress note/recertification  []  See Discharge Summary         Progress towards goals / Updated goals:  See POC    PLAN  []  Upgrade activities as tolerated     [x]  Continue plan of care  []  Update interventions per flow sheet       []  Discharge due to:_  []  Other:_  PFM assessment next session; review coconut oil and diet: inflammation/irritants for pelvic pain    Martin Mott, PT 10/27/2020  12:48 PM

## 2020-10-27 NOTE — PROGRESS NOTES
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 Assessment/ key information: Patient is a 69year old female who presents to Physical Therapy with c/o pelvic pain and urinary incontinence. Patient presents with ROM restrictions and decreased strength. Patient also presents with s/s consistent with tightness and tenderness of the pelvic floor muscles, and decreased ability to contract, relax, and elongate the pelvic floor muscles; PFM assessment will be performed in next 1-2 sessions. I feel patient will benefit from skilled therapeutic intervention to improve their ability to function at highest level possible.      Patient will continue to benefit from skilled PT services to modify and progress therapeutic interventions, address functional mobility deficits, address ROM deficits, address strength deficits, analyze and address soft tissue restrictions, analyze and cue movement patterns, analyze and modify body mechanics/ergonomics and assess and modify postural abnormalities to attain remaining goals.          Evaluation Complexity History MEDIUM  Complexity : 1-2 comorbidities / personal factors will impact the outcome/ POC ; Examination MEDIUM Complexity : 3 Standardized tests and measures addressing body structure, function, activity limitation and / or participation in recreation  ;Presentation MEDIUM Complexity : Evolving with changing characteristics  ; Clinical Decision Making MEDIUM Complexity : FOTO score of 26-74  Overall Complexity Rating: MEDIUM    Problem List: Pelvic pain/dysfunction, Decreased pelvic floor mm awareness, Decreased pelvic floor mm strength and Improper voiding habits    Treatment Plan may include any combination of the following:   Therapeutic exercise, Urge suppression techniques, Neuromuscular re-education, Manual therapy, Physical agent/modality and Patient education  Patient / Family readiness to learn indicated by: asking questions and interest    Persons(s) to be included in education: patient (P)    Barriers to Learning/Limitations: None  Measures taken if barriers to learning: NA    Patient Goal (s): Relief of pain and more control over night time urinary issues    Patient Self Reported Health Status: good    Rehabilitation Potential: good    Short Term Goals: To be accomplished in 4 weeks:  Patient will demonstrate proper dietary/fluid habits and urge suppression strategies that promote bladder and bowel health to aid in management of urinary urgency and incontinence. Status at eval: Patient consuming irriants and unaware urge suppression strategies. Patient will tolerate insertion of medium dilator, static, for 15 minutes with pain less than 4/10  Status at eval: all insertion elicits 7/85 pain, unable to have intercourse    Patient will demonstrate independence with HEP and self-management that promotes pelvic floor relaxation and awareness. Status at Eval: Patient performing no activity for self-care and/or stress/pelvic pain management. Long Term Goals:  To be accomplished in 8 weeks:  Pt will get up during the night 0-1x to urinate signifying age-appropriate nocturia and improved sleeping routine. Eval: 2-3x/ night, always with UUI sometimes having to change pad    Patient will demonstrate improvement of current complaints evidenced by a 50%   improvement in FOTO, PFDI score  Status at Eval: PFDI 25    Frequency / Duration: Patient to be seen 2 times per week for 8 weeks. Patient/ Caregiver education and instruction: Diagnosis, prognosis, Proper Voiding Habits, Diet, Pain Management, Exercises and Bladder Retraining   [x]  Plan of care has been reviewed with PTA    Certification Period: 10/27/2020 - 01/25/2021    Joaquina Holcomb, PT 10/27/2020 12:48 PM    ________________________________________________________________________    I certify that the above Therapy Services are being furnished while the patient is under my care. I agree with the treatment plan and certify that this therapy is necessary.     Physician's Signature:____________________  Date:____________Time:____________    Please sign and return to [facility]  [address], [address] [facility] [place]  Ph [phone]  Fx [phone]

## 2020-10-28 ENCOUNTER — APPOINTMENT (OUTPATIENT)
Dept: PHYSICAL THERAPY | Age: 75
End: 2020-10-28
Payer: MEDICARE

## 2020-11-05 ENCOUNTER — HOSPITAL ENCOUNTER (OUTPATIENT)
Dept: PHYSICAL THERAPY | Age: 75
Discharge: HOME | End: 2020-11-05
Payer: MEDICARE

## 2020-11-05 PROCEDURE — 97140 MANUAL THERAPY 1/> REGIONS: CPT

## 2020-11-05 PROCEDURE — 97535 SELF CARE MNGMENT TRAINING: CPT

## 2020-11-05 PROCEDURE — 97112 NEUROMUSCULAR REEDUCATION: CPT

## 2020-11-05 NOTE — PROGRESS NOTES
PT DAILY TREATMENT NOTE    Patient Name: Aminah Dyson  Date:2020  : 1945  [x]  Patient  Verified  Payor: Cristofer Alvarado / Plan: [place] / Product Type: Medicare /    In time:1245  Out time:130  Total Treatment Time (min): 45  Total Timed Codes (min): 45  1:1 Treatment Time ( [address] only): 45   Visit #: 2 of 16    Treatment Area: Other specified conditions associated with female genital organs and menstrual cycle [N94.89]    SUBJECTIVE  Pain Level (0-10 scale): 7 pelvic pain, burning vaginally  Any medication changes, allergies to medications, adverse drug reactions, diagnosis change, or new procedure performed?: [x] No    [] Yes (see summary sheet for update)  Subjective functional status/changes:   [] No changes reported  Patient reports: compliance with current HEP. States she has increased water and decreased soda; states she had a colonoscopy which \"drained her. \"     OBJECTIVE    15 min Self Care: Thorough review and handout provided on the following: inflammatory food review and suggested to review whole 30 online. Recommendation for pain management: Mr Jaswinder Byrnes (instructions provided) and nightly coconut oil. Discussed/explained lichen sclerosis per patient request and symptom management  Encouraged starting premarin as Rx by referring MD   Rationale:  Increase awareness and understanding of current condition to improve patients ability to independently and effectively attain goals and progress towards long term management of current condition. 15 min Neuromuscular Re-education:  [x]  See flow sheet : PFM contraction and elongation    Rationale: increase ROM, improve coordination and increase proprioception  to improve the patients ability to increase blood flow and decrease PFM tension/tenderness and pain.      15 min Manual Therapy:  Introitus and deep posterior PFM gentle STM   Rationale: decrease pain, increase tissue extensibility and decrease trigger points to improve the patients ability to decrease tone/tenderness and increase blood flow          With   [] TE   [] TA   [] neuro   [] other: Patient Education: [x] Review HEP    [] Progressed/Changed HEP based on:   [] positioning   [] body mechanics   [] transfers   [] heat/ice application    [] other:      Other Objective/Functional Measures: Pelvic Floor Assessment  Patient was educated on pelvic floor anatomy, structure, and function and implications for current presentation of s/s. Patient consents to pelvic floor assessment.        External trigger point/ muscle tenderness:  bilateral adductors  Superficial PFM tenderness/restriction NONE    PFM Screen:    Skin Integrity:  [] Healthy [] Red  [x] Labia Atrophy [] Fragile    Sensation: [x] Intact [] Diminished:    Muscle Bulk: [x] Symmetrical  [] Well-developed [] Atrophied:  []L   []R   []B    Prolapse: [] Cystocele:   [] Rectocele:     Ability to actively bulge: min  Bulge with cough min; bulge min with knack prior to cough    Pelvic floor manual exam: Performed via internal vaginal assessment  female-upon vaginal palpation of the pelvic floor, the following was noted: generalized decreased tissue extensibility with hypersensitivity  Introitus restriction/TTP (reported as hands on a clock): throughout  Mild TTP/restriction: 1-6, 10-11  Moderate TTP/resriction:6-9    Deep PFM Tenderness/Restriction:    Right Left   pubococcygeus mod mod   Ischiococcygeus mod mod   Iliococcygeus mod mod   Obturator Internus mod mod   coccyx NT         **All TTP reported mod with LIGHT pressure, would be severe with moderate pressure and does elicit pain similar to pelvic pain complaints    Pelvic floor MMT  2 - partial elevation  PERF (Performance/Endurance/Repetitions//Flicks): 2/4/7//2      Pelvic muscle release pattern  1 - poor release, no sensation of release       Pain Level (0-10 scale) post treatment: 7    ASSESSMENT/Changes in Function: Patient tolerated today's session well with no c/o pain. Patient demonstrated understanding of today's instruction, education, and recommendations. Patient is progressing appropriately towards goals. Patient will continue to benefit from skilled PT services to modify and progress therapeutic interventions, address functional mobility deficits, address ROM deficits, address strength deficits, analyze and address soft tissue restrictions, analyze and cue movement patterns, analyze and modify body mechanics/ergonomics and assess and modify postural abnormalities to attain remaining goals. [x]  See Plan of Care  []  See progress note/recertification  []  See Discharge Summary         Progress towards goals / Updated goals:  Short Term Goals: To be accomplished in 4 weeks:  Patient will demonstrate proper dietary/fluid habits and urge suppression strategies that promote bladder and bowel health to aid in management of urinary urgency and incontinence. Status at eval: Patient consuming irriants and unaware urge suppression strategies.      Patient will tolerate insertion of medium dilator, static, for 15 minutes with pain less than 4/10  Status at eval: all insertion elicits 1/21 pain, unable to have intercourse     Patient will demonstrate independence with HEP and self-management that promotes pelvic floor relaxation and awareness. Status at Eval: Patient performing no activity for self-care and/or stress/pelvic pain management.      Long Term Goals: To be accomplished in 8 weeks:  Pt will get up during the night 0-1x to urinate signifying age-appropriate nocturia and improved sleeping routine.   Eval: 2-3x/ night, always with UUI sometimes having to change pad     Patient will demonstrate improvement of current complaints evidenced by a 50%   improvement in FOTO, PFDI score  Status at Eval: PFDI 25    PLAN  []  Upgrade activities as tolerated     [x]  Continue plan of care  [x]  Update interventions per flow sheet       []  Discharge due to:_  []  Other:_ PFM downtraining    Jessie Osullivan, PT 11/5/2020  12:47 PM    Future Appointments   Date Time Provider Howard Larsen   11/6/2020  9:30 AM Erin Holcomb, PT [facility]   11/10/2020 12:00 PM Vinh Marcial, [facility] [place] [facility]   11/12/2020  3:00 PM Vinh Marcial, [facility] [place] [facility]   11/16/2020  9:30 AM Erin Vergara, [facility] [place] [facility]   11/18/2020  8:45 AM Erin Vergara, [facility] [place] [facility]   11/24/2020 10:15 AM Vinh Marcial, [facility] [place] [facility]   11/25/2020  8:45 AM Vinh Marcial, [facility] [place] [facility]   11/30/2020 10:30 AM Vinh Marcial [facility] [place] [facility]   1/11/2021 10:00 AM Lata Javed NP [facility] BS AMB

## 2020-11-06 ENCOUNTER — HOSPITAL ENCOUNTER (OUTPATIENT)
Dept: PHYSICAL THERAPY | Age: 75
Discharge: HOME | End: 2020-11-06
Payer: MEDICARE

## 2020-11-06 PROCEDURE — 97110 THERAPEUTIC EXERCISES: CPT

## 2020-11-06 PROCEDURE — 97530 THERAPEUTIC ACTIVITIES: CPT

## 2020-11-06 NOTE — PROGRESS NOTES
PT DAILY TREATMENT NOTE    Patient Name: Bartolo Perez  Date:2020  : 1945  [x]  Patient  Verified  Payor: Zhoufrances [place] / Plan: [place] MEDICARE PART A & B / Product Type: Medicare /    In time:930  Out time:  Total Treatment Time (min): 45  Total Timed Codes (min): 45  1:1 Treatment Time ( [address] only): 45   Visit #: 3 of 16    Treatment Area: Other specified conditions associated with female genital organs and menstrual cycle [N94.89]    SUBJECTIVE  Pain Level (0-10 scale): 5-6 vaginal burning  Any medication changes, allergies to medications, adverse drug reactions, diagnosis change, or new procedure performed?: [x] No    [] Yes (see summary sheet for update)  Subjective functional status/changes:   [] No changes reported  Patient reports: compliance with current HEP. Last night purchased coconut oil and  Shawn Kirby, did not start using last night. Plans to start all meds and management tools this weekend. OBJECTIVE      30 min Therapeutic Exercise:  [x] See flow sheet :    Rationale: increase ROM to improve the patients ability to downtrain PFM and improve functional mobility throughout lumbopelvic girdle      5 min Self Care: Reviewed plan for weekend: being Rx as recommended by MD and Mr Shawn Kirby for pain management  Reviewed rationale for todays intervention to use as pain management tools and daily activity as well as plan for future tx   Rationale:  Increase awareness and understanding of current condition to improve patients ability to independently and effectively attain goals and progress towards long term management of current condition.      15 min Therapeutic Activity:  [x]  See flow sheet : physiological quieting/guided body scan and downtraining for PFM relaxation   Rationale: increase ROM and increase proprioception  to improve the patients ability to decrease tone/tension throughout lumbopelvic region for chronic pain management      With   [] TE   [] TA   [] neuro   [] other: Patient Education: [x] Review HEP    [] Progressed/Changed HEP based on:   [] positioning   [] body mechanics   [] transfers   [] heat/ice application    [] other:        Pain Level (0-10 scale) post treatment: 4    ASSESSMENT/Changes in Function: Patient tolerated today's session well with no c/o pain. Patient demonstrated understanding of today's instruction, education, and recommendations. Patient is progressing appropriately towards goals. Patient will continue to benefit from skilled PT services to modify and progress therapeutic interventions, address functional mobility deficits, address ROM deficits, address strength deficits, analyze and address soft tissue restrictions, analyze and cue movement patterns, analyze and modify body mechanics/ergonomics and assess and modify postural abnormalities to attain remaining goals. [x]  See Plan of Care  []  See progress note/recertification  []  See Discharge Summary         Progress towards goals / Updated goals:  Short Term Goals: To be accomplished in 4 weeks:  Patient will demonstrate proper dietary/fluid habits and urge suppression strategies that promote bladder and bowel health to aid in management of urinary urgency and incontinence. Status at eval: Patient consuming irriants and unaware urge suppression strategies.      Patient will tolerate insertion of medium dilator, static, for 15 minutes with pain less than 4/10  Status at eval: all insertion elicits 1/75 pain, unable to have intercourse     Patient will demonstrate independence with HEP and self-management that promotes pelvic floor relaxation and awareness.    Status at Eval: Patient performing no activity for self-care and/or stress/pelvic pain management.   11/6/2020 intro to physiological quieting, diaphragmatic breathing with PFM elongation and pelvic girdle stretch routine today     Long Term Goals: To be accomplished in 8 weeks:  Pt will get up during the night 0-1x to urinate signifying age-appropriate nocturia and improved sleeping routine.   Eval: 2-3x/ night, always with UUI sometimes having to change pad     Patient will demonstrate improvement of current complaints evidenced by a 50%   improvement in FOTO, PFDI score  Status at Eval: [facility]  []  Upgrade activities as tolerated     [x]  Continue plan of care  [x]  Update interventions per flow sheet       []  Discharge due to:_  []  Other:_  Bladder fitness, urge suppression, dietary irritants next session; biofeedback    Avinash Taylor PT 11/6/2020  9:36 AM    Future Appointments   Date Time Provider Howard Larsen   11/10/2020 12:00 PM Cielo Lu, PT [facility]   11/12/2020  3:00 PM Cielo Lu, [facility]   11/16/2020  9:30 AM Linh Castellon, PT [facility]   11/18/2020  8:45 AM Linh Castellon, PT [facility]   11/24/2020 10:15 AM Cielo Lu [facility]   11/25/2020  8:45 AM Cielo Lu, [facility]   11/30/2020 10:30 AM Zhang Riversidetasha Verma, [facility]   1/11/2021 10:00 AM Efraín Martinez NP [facility] AMB

## 2020-11-10 ENCOUNTER — HOSPITAL ENCOUNTER (OUTPATIENT)
Dept: PHYSICAL THERAPY | Age: 75
Discharge: HOME | End: 2020-11-10
Payer: MEDICARE

## 2020-11-10 PROCEDURE — 97535 SELF CARE MNGMENT TRAINING: CPT

## 2020-11-10 NOTE — PROGRESS NOTES
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 States she started her Rx as recommended and purchased coconut oil as well as items for Mr Pravin Canchola ice pack however has not tried yet. States she is leaking every time with night time voids. OBJECTIVE      45 min Self Care: Thorough review and handout provided on the following: bladder irritants, bladder fitness, and urge suppression  Water goals  Reviewed double void strategies  Diaphragmatic breathing for chronic pain management, ed on finding off-ramp and management tools; increase function goal   Rationale:  Increase awareness and understanding of current condition to improve patients ability to independently and effectively attain goals and progress towards long term management of current condition. With   [] TE   [] TA   [] neuro   [] other: Patient Education: [x] Review HEP    [] Progressed/Changed HEP based on:   [] positioning   [] body mechanics   [] transfers   [] heat/ice application    [] other:        Pain Level (0-10 scale) post treatment: 7/10 vaginal (no apparent reason) ; LBP 10    ASSESSMENT/Changes in Function: Patient tolerated today's session well with no c/o pain. Patient demonstrated understanding of today's instruction, education, and recommendations.  Patient is progressing appropriately towards goals. Patient will continue to benefit from skilled PT services to modify and progress therapeutic interventions, address functional mobility deficits, address ROM deficits, address strength deficits, analyze and address soft tissue restrictions, analyze and cue movement patterns, analyze and modify body mechanics/ergonomics and assess and modify postural abnormalities to attain remaining goals. [x]  See Plan of Care  []  See progress note/recertification  []  See Discharge Summary         Progress towards goals / Updated goals:  Short Term Goals: To be accomplished in 4 weeks:  Patient will demonstrate proper dietary/fluid habits and urge suppression strategies that promote bladder and bowel health to aid in management of urinary urgency and incontinence. Status at eval: Patient consuming irriants and unaware urge suppression strategies. 11/10/2020 introduced today     Patient will tolerate insertion of medium dilator, static, for 15 minutes with pain less than 4/10  Status at eval: all insertion elicits 9/17 pain, unable to have intercourse     Patient will demonstrate independence with HEP and self-management that promotes pelvic floor relaxation and awareness. Status at Eval: Patient performing no activity for self-care and/or stress/pelvic pain management.   11/6/2020 intro to physiological quieting, diaphragmatic breathing with PFM elongation and pelvic girdle stretch routine today     Long Term Goals: To be accomplished in 8 weeks:  Pt will get up during the night 0-1x to urinate signifying age-appropriate nocturia and improved sleeping routine.   Eval: 2-3x/ night, always with UUI sometimes having to change pad     Patient will demonstrate improvement of current complaints evidenced by a 50%   improvement in FOTO, PFDI score  Status at Eval: [address] [place] [address]  []  Upgrade activities as tolerated     [x]  Continue plan of care  [x]  Update interventions per flow sheet       []  Discharge due to:_  []  Other:_  Biofeedback; transfers and transitions with pelvic bracing    Mary Long, PT 11/10/2020  10:18 AM    Future Appointments   Date Time Provider Howard Larsen   11/12/2020  3:00 PM Laina Acosta, [facility]   11/16/2020  9:30 AM Dewar, Leellen Litten, [facility]   11/18/2020  8:45 AM Jodene Anthony, Leellen Litten, [facility]   11/24/2020 10:15 AM Laina Acosta [facility]   11/25/2020  8:45 AM Laina Acosta, [facility]   11/30/2020 10:30 AM Laina Acosta [facility]   1/11/2021 10:00 AM Carla Vo NP [facility] AMB

## 2020-11-12 ENCOUNTER — HOSPITAL ENCOUNTER (OUTPATIENT)
Dept: PHYSICAL THERAPY | Age: 75
Discharge: HOME | End: 2020-11-12
Payer: MEDICARE

## 2020-11-12 PROCEDURE — 97535 SELF CARE MNGMENT TRAINING: CPT

## 2020-11-12 PROCEDURE — 97530 THERAPEUTIC ACTIVITIES: CPT

## 2020-11-12 NOTE — PROGRESS NOTES
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 Patient states she has not had coffee or soda in a few days, only water and progressing towards water goal. States she re-read information last night and notes increase in awareness. States she is noting less UI with transfers in terms of amount of leakage. Per pt report colonoscopy results indicate precancerous polyps, to f/u in 3 years; states GI MD would \"love to hear I got off the sugar\"   Patient reports she has to leave in 30 minutes. OBJECTIVE      20 min Self Care: Thorough review and handout provided on the following: sugar content in fruits  Per patient request reviewed specifics of whole30 and strategies to initiate plus health benefits of cutting/decreasing sugar intake   Rationale:  Increase awareness and understanding of current condition to improve patients ability to independently and effectively attain goals and progress towards long term management of current condition.      10 min Therapeutic Activity:  [x]  See flow sheet : STS with PFMC and exhale   Rationale: increase strength, improve coordination and increase proprioception  to improve the patients ability to transfer with better form and less UI            With   [] TE   [] TA   [] neuro   [] other: Patient Education: [x] Review HEP    [] Progressed/Changed HEP based on:   [] positioning   [] body mechanics   [] transfers   [] heat/ice application    [] other:        Pain Level (0-10 scale) post treatment: 5    ASSESSMENT/Changes in Function: Patient tolerated today's session well with no c/o pain. Patient demonstrated understanding of today's instruction, education, and recommendations. Patient is progressing appropriately towards goals. Patient will continue to benefit from skilled PT services to modify and progress therapeutic interventions, address functional mobility deficits, address ROM deficits, address strength deficits, analyze and address soft tissue restrictions, analyze and cue movement patterns and analyze and modify body mechanics/ergonomics to attain remaining goals. [x]  See Plan of Care  []  See progress note/recertification  []  See Discharge Summary         Progress towards goals / Updated goals:  Short Term Goals: To be accomplished in 4 weeks:  Patient will demonstrate proper dietary/fluid habits and urge suppression strategies that promote bladder and bowel health to aid in management of urinary urgency and incontinence. Status at eval: Patient consuming irriants and unaware urge suppression strategies. 11/10/2020 introduced today     Patient will tolerate insertion of medium dilator, static, for 15 minutes with pain less than 4/10  Status at eval: all insertion elicits 4/44 pain, unable to have intercourse     Patient will demonstrate independence with HEP and self-management that promotes pelvic floor relaxation and awareness.    Status at Eval: Patient performing no activity for self-care and/or stress/pelvic pain management.   11/6/2020 intro to physiological quieting, diaphragmatic breathing with PFM elongation and pelvic girdle stretch routine today     Long Term Goals: To be accomplished in 8 weeks:  Pt will get up during the night 0-1x to urinate signifying age-appropriate nocturia and improved sleeping routine.   Eval: 2-3x/ night, always with UUI sometimes having to change pad     Patient will demonstrate improvement of current complaints evidenced by a 50%   improvement in FOTO, PFDI score  Status at Eval: [facility]  []  Upgrade activities as tolerated     [x]  Continue plan of care  [x]  Update interventions per flow sheet       []  Discharge due to:_  []  Other:_  Biofeedback next session; progress towards pelvic pain goals    Carola Marion PT 11/12/2020  3:08 PM    Future Appointments   Date Time Provider Howard Larsen   11/16/2020  9:30 AM Bindu Holcomb, PT [facility]   11/18/2020  8:45 AM [place] B[place]u Edwards, [facility]   11/24/2020 10:15 AM Arleth Cabrera, [facility]   11/25/2020  8:45 AM Arleth Cabrera, [facility]   11/30/2020 10:30 AM Arleth Cabrera, [facility]   1/11/2021 10:00 AM J Carlos Ospina NP [facility] BS AMB

## 2020-11-16 ENCOUNTER — HOSPITAL ENCOUNTER (OUTPATIENT)
Dept: PHYSICAL THERAPY | Age: 75
Discharge: HOME | End: 2020-11-16
Payer: MEDICARE

## 2020-11-16 PROCEDURE — 97535 SELF CARE MNGMENT TRAINING: CPT

## 2020-11-16 PROCEDURE — 97112 NEUROMUSCULAR REEDUCATION: CPT

## 2020-11-16 NOTE — PROGRESS NOTES
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 States she has been using kegel and \"blow as you go\" with transitions for avoiding GENESIS which has been successful \"one or two nights this week\"     OBJECTIVE      25 min Self Care: Thorough review and handout provided on the following: biofeedback expectations, results, and implications for treatment: PFM HEP 10/15/10 BID AM/PM and s/s monitoring    Rationale:  Increase awareness and understanding of current condition to improve patients ability to independently and effectively attain goals and progress towards long term management of current condition.      20 min Neuromuscular Re-education:  [x]  See flow sheet : via sEMG perianal electrodes  Patient required VC/TC and visual feedback for normal breathing and proper activation of pelvic floor muscles; reviewed in mass practice   Rationale: increase strength, improve coordination and increase proprioception  to improve the patients ability to decrease pain and tension    Biofeedback expectations and implications were reviewed with patient prior to intervention,   Performed sEMG with perianal electrodes as follows:    Position, initial resting tone Work/Rest/Reps Comments   Sidelying (left)   3.0mV 10/10/5  10/10/5  2/4/10 Demo decreased motor recruitment and overall W-R ratio; Patient required rest break and explanation of results for proper form   Difficulty coordinating exhale and kegel with quicks    Resting tone decreased following todays NM re-ed: <2.0 mV   Sitting       Standing                Neuromuscular Re-education was provided with visual, verbal and tactile cueing throughout intervention  Results and implications for treatment and HEP were reviewed in detail with patient during and following today's intervention. With   [] TE   [] TA   [] neuro   [] other: Patient Education: [x] Review HEP    [] Progressed/Changed HEP based on:   [] positioning   [] body mechanics   [] transfers   [] heat/ice application    [] other:         Pain Level (0-10 scale) post treatment: 8    ASSESSMENT/Changes in Function: Patient tolerated today's session well with no c/o pain. Patient demonstrated understanding of today's instruction, education, and recommendations. Patient is progressing appropriately towards goals. Patient will continue to benefit from skilled PT services to modify and progress therapeutic interventions, address functional mobility deficits, address ROM deficits, address strength deficits, analyze and address soft tissue restrictions, analyze and cue movement patterns, analyze and modify body mechanics/ergonomics and assess and modify postural abnormalities to attain remaining goals. [x]  See Plan of Care  []  See progress note/recertification  []  See Discharge Summary         Progress towards goals / Updated goals:  Short Term Goals: To be accomplished in 4 weeks:  Patient will demonstrate proper dietary/fluid habits and urge suppression strategies that promote bladder and bowel health to aid in management of urinary urgency and incontinence.   Status at eval: Patient consuming irriants and unaware urge suppression strategies.   11/10/2020 introduced today     Patient will tolerate insertion of medium dilator, static, for 15 minutes with pain less than 4/10  Status at eval: all insertion elicits 3/00 pain, unable to have intercourse     Patient will demonstrate independence with HEP and self-management that promotes pelvic floor relaxation and awareness. Status at Eval: Patient performing no activity for self-care and/or stress/pelvic pain management.   11/6/2020 intro to physiological quieting, diaphragmatic breathing with PFM elongation and pelvic girdle stretch routine today     Long Term Goals: To be accomplished in 8 weeks:  Pt will get up during the night 0-1x to urinate signifying age-appropriate nocturia and improved sleeping routine.   Eval: 2-3x/ night, always with UUI sometimes having to change pad     Patient will demonstrate improvement of current complaints evidenced by a 50%   improvement in FOTO, PFDI score  Status at Eval: [address] [place] [address]  []  Upgrade activities as tolerated     [x]  Continue plan of care  [x]  Update interventions per flow sheet       []  Discharge due to:_  []  Other:_ progress towards pelvic pain goals    Felipe Gutiérrez, PT 11/16/2020  9:37 AM    Future Appointments   Date Time Provider Howard Larsen   11/18/2020  8:45 AM Shannon Bailey, PT [facility]   11/24/2020 10:15 AM Jayshree Saldaña, PT [facility]   11/25/2020  8:45 AM Jayshree Saldaña, PT [facility]   11/30/2020 10:30 AM Jayshree Saldaña PT [facility]   1/11/2021 10:00 AM Jakub Glover NP [facility]

## 2020-11-18 ENCOUNTER — HOSPITAL ENCOUNTER (OUTPATIENT)
Dept: PHYSICAL THERAPY | Age: 75
Discharge: HOME | End: 2020-11-18
Payer: MEDICARE

## 2020-11-18 PROCEDURE — 97110 THERAPEUTIC EXERCISES: CPT

## 2020-11-18 PROCEDURE — 97535 SELF CARE MNGMENT TRAINING: CPT

## 2020-11-18 NOTE — PROGRESS NOTES
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 Patient states she has not had to change a pad at night; attributes that to strategies during transfers. States she had a \"back spasm\" yesterday trying to get off of exam table at PCP; reports it is still \"flared up pretty bad today\" No soreness following last session. OBJECTIVE      35 min Therapeutic Exercise:  [x] See flow sheet : [facility]   Rationale: increase strength and improve coordination to improve the patients ability to improve muscle activation and decrease LBP while progressing towards core stabilization goals      10 min Self Care: Thorough review rationale for core stabs as it relates to urinary and pelvic pain issues as well as back pain  Ed on benefits of ortho PT for addressing back issues. Rationale:  Increase awareness and understanding of current condition to improve patients ability to independently and effectively attain goals and progress towards long term management of current condition.                With   [] TE   [] TA   [] neuro   [] other: Patient Education: [x] Review HEP    [] Progressed/Changed HEP based on:   [] positioning   [] body mechanics   [] transfers   [] heat/ice application    [] other:         Pain Level (0-10 scale) post treatment: 4/10 LBP    ASSESSMENT/Changes in Function: Patient tolerated today's session well with decreased c/o pain. Patient demonstrated understanding of today's instruction, education, and recommendations. Patient is progressing appropriately towards goals. Patient will continue to benefit from skilled PT services to modify and progress therapeutic interventions, address functional mobility deficits, address ROM deficits, address strength deficits, analyze and address soft tissue restrictions, analyze and cue movement patterns, analyze and modify body mechanics/ergonomics and assess and modify postural abnormalities to attain remaining goals. [x]  See Plan of Care  []  See progress note/recertification  []  See Discharge Summary         Progress towards goals / Updated goals:  Short Term Goals: To be accomplished in 4 weeks:  Patient will demonstrate proper dietary/fluid habits and urge suppression strategies that promote bladder and bowel health to aid in management of urinary urgency and incontinence. Status at eval: Patient consuming irriants and unaware urge suppression strategies.      Patient will tolerate insertion of medium dilator, static, for 15 minutes with pain less than 4/10  Status at eval: all insertion elicits 6/41 pain, unable to have intercourse     Patient will demonstrate independence with HEP and self-management that promotes pelvic floor relaxation and awareness. Status at Eval: Patient performing no activity for self-care and/or stress/pelvic pain management. 11/18/2020 intro so basic core stabilization therex/HEP today     Long Term Goals: To be accomplished in 8 weeks:  Pt will get up during the night 0-1x to urinate signifying age-appropriate nocturia and improved sleeping routine.   Eval: 2-3x/ night, always with UUI sometimes having to change pad     Patient will demonstrate improvement of current complaints evidenced by a 50%   improvement in FOTO, PFDI score  Status at Eval: PFDI 25    PLAN  []  Upgrade activities as tolerated     [x]  Continue plan of care  [x]  Update interventions per flow sheet       []  Discharge due to:_  []  Other:_  PROG 11/25    Norm Holden, PT 11/18/2020  8:48 AM    Future Appointments   Date Time Provider Howard Larsen   11/24/2020 10:15 AM Melida Holcomb, PT [facility]   11/25/2020  8:45 AM Barbara Going, PT [facility]   11/30/2020 10:30 AM Barbara Going, PT [facility]   1/11/2021 10:00 AM CHAYO Parsons BS AMB

## 2020-11-24 ENCOUNTER — APPOINTMENT (OUTPATIENT)
Dept: PHYSICAL THERAPY | Age: 75
End: 2020-11-24
Payer: MEDICARE

## 2020-11-25 ENCOUNTER — APPOINTMENT (OUTPATIENT)
Dept: PHYSICAL THERAPY | Age: 75
End: 2020-11-25
Payer: MEDICARE

## 2020-11-30 ENCOUNTER — APPOINTMENT (OUTPATIENT)
Dept: PHYSICAL THERAPY | Age: 75
End: 2020-11-30
Payer: MEDICARE

## 2020-12-02 ENCOUNTER — HOSPITAL ENCOUNTER (OUTPATIENT)
Dept: PHYSICAL THERAPY | Age: 75
Discharge: HOME | End: 2020-12-02
Payer: MEDICARE

## 2020-12-02 PROCEDURE — 97535 SELF CARE MNGMENT TRAINING: CPT

## 2020-12-02 PROCEDURE — 97140 MANUAL THERAPY 1/> REGIONS: CPT

## 2020-12-02 NOTE — PROGRESS NOTES
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.819] Onset Date:about 20 years ago     Prior Hospitalization: see medical history Provider#: [id number]   Medications: Verified on Patient Summary List    Comorbidities: lichen sclerosis, 2 vaginal deliveries   Prior Level of Function: Prior to menopause approximately 20 years ago no vaginal or urinary pain or issues    Visits from Start of Care: 7    Missed Visits: 0    Established Goals:           Excellent Good         Limited           None  [x] Increase Pelvic MM strength       []  []  [x]  []  [x] Decrease Pelvic MM hypertonus     []  []  []  [x]  [x] Decrease Incontinence Episodes    []  [x]  []  []   [x] Improve Voiding Habits        []  [x]  []  []   [x] Decreased Urgency        []  [x]  []  []  [x] Decrease Pelvic Pain        []  []  [x]  []    Key Functional Changes: Primary complaint currently is pelvic pain and inability to have intercourse  Patient reports overall 30% improvement in urinary s/s since IE. Updated Goals: to be achieved in 8 weeks:   Short Term Goals: To be accomplished in 4 weeks:  Patient will demonstrate proper dietary/fluid habits and urge suppression strategies that promote bladder and bowel health to aid in management of urinary urgency and incontinence. Status at eval: Patient consuming irriants and unaware urge suppression strategies.    20 Issues only present at night time; \"I will have a good spell and then I won't have a good spell\" but still having UUI first thing in the morning when standing up but sometimes able to suppress urge a few minutes longer, about 30% improvement     Patient will tolerate insertion of medium dilator, static, for 15 minutes with pain less than 4/10  Status at eval: all insertion elicits 9/81 pain, unable to have intercourse  11/18/20 have not addressed pelvic pain goals at this time.      Patient will demonstrate independence with HEP and self-management that promotes pelvic floor relaxation and awareness. Status at Eval: Patient performing no activity for self-care and/or stress/pelvic pain management.   11/18/20  compliant with HEP and management tools; decrease in frequency of pain about 5-10%     Long Term Goals: To be accomplished in 8 weeks:  Pt will get up during the night 0-1x to urinate signifying age-appropriate nocturia and improved sleeping routine. Eval: 2-3x/ night, always with UUI sometimes having to change pad  11/18/20 Now urinating 0-2 times/night , more often 1 time/night ; has not had to change pad.      Patient will demonstrate improvement of current complaints evidenced by a 50%   improvement in FOTO, PFDI score  Status at Eval: PFDI 21  11/18/20  PFDI 21, no change     ASSESSMENT/RECOMMENDATIONS:  [x]Continue therapy per initial plan/protocol at a frequency of  2 x per week for 8 weeks  []Continue therapy with the following recommended changes:_____________________      _____________________________________________________________________  []Discontinue therapy progressing towards or have reached established goals  []Discontinue therapy due to lack of appreciable progress towards goals  []Discontinue therapy due to lack of attendance or compliance  []Await Physician's recommendations/decisions regarding therapy  []Other:________________________________________________________________    Thank you for this referral.   Elle Lancaster, PT 12/2/2020 8:50 AM  NOTE TO PHYSICIAN:  [address]   FAX TO [facility]: [phone]  If you are unable to process this request in 24 hours please contact our office: [phone]      ?  I have read the above report and request that my patient continue as recommended. ? I have read the above report and request that my patient continue therapy with the following changes/special instructions:___________________________________________________________  ? I have read the above report and request that my patient be discharged from therapy.

## 2020-12-02 NOTE — PROGRESS NOTES
PT DAILY TREATMENT NOTE    Patient Name: Sharad Gates  Date:2020  : 1945  [x]  Patient  Verified  Payor: Rodolfo Olivares / Plan: [place] MEDICARE PART A & B / Product Type: Medicare /    In [id number]  Out time:930  Total Treatment Time (min): 45  Total Timed Codes (min): 45  1:1 Treatment Time ( [address] only): 39   Visit #: 8 of 16    Treatment Area: Vulvodynia [N94.819]    SUBJECTIVE  Pain Level (0-10 scale): 3 vaginal pain  Any medication changes, allergies to medications, adverse drug reactions, diagnosis change, or new procedure performed?: [x] No    [] Yes (see summary sheet for update)  Subjective functional status/changes:   [] No changes reported  Patient reports:  compliance with current HEP. Patient states she has reduced sugar and notes an improvement in leg pain. Also noting a decrease in pelvic pain frequency. Patient would like to try dropping down to once a week in December. OBJECTIVE    20 min Self Care: Thorough review progress and goals to date; reviewed urinary recommendations and dietary irritants again per patient request.   Reviewed plan for future treatment   Provided with XS dilator for self-management tool for external trigger point release only at this time , no more than 4/10 pain progressing towards 15 minutes   Rationale:  Increase awareness and understanding of current condition to improve patients ability to independently and effectively attain goals and progress towards long term management of current condition.      25 min Manual Therapy:  Skin rolling bilateral proximal LEs   STM/trigger point release bilateral adductors and superficial PFM/perineum, pain 4-6/10   Rationale: decrease pain, increase ROM, increase tissue extensibility, decrease trigger points and increase postural awareness to improve the patients ability to downtrain and desensitize pelvic floor muscles and tension            With   [] TE   [x] TA   [] neuro   [] other: Patient Education: [x] Review HEP    [] Progressed/Changed HEP based on: dilator  [] positioning   [] body mechanics   [] transfers   [] heat/ice application    [] other:          Pain Level (0-10 scale) post treatment: 4    ASSESSMENT/Changes in Function: Patient tolerated today's session well with no c/o pain. Patient demonstrated understanding of today's instruction, education, and recommendations. Patient is progressing appropriately towards goals. Patient will continue to benefit from skilled PT services to modify and progress therapeutic interventions, address functional mobility deficits, address ROM deficits, address strength deficits, analyze and address soft tissue restrictions, analyze and cue movement patterns, analyze and modify body mechanics/ergonomics and assess and modify postural abnormalities to attain remaining goals. [x]  See Plan of Care  []  See progress note/recertification  []  See Discharge Summary         Progress towards goals / Updated goals:    Short Term Goals: To be accomplished in 4 weeks:  Patient will demonstrate proper dietary/fluid habits and urge suppression strategies that promote bladder and bowel health to aid in management of urinary urgency and incontinence. Status at eval: Patient consuming irriants and unaware urge suppression strategies. 11/18/20 Issues only present at night time; \"I will have a good spell and then I won't have a good spell\" but still having UUI first thing in the morning when standing up but sometimes able to suppress urge a few minutes longer, about 30% improvement     Patient will tolerate insertion of medium dilator, static, for 15 minutes with pain less than 4/10  Status at eval: all insertion elicits 6/60 pain, unable to have intercourse  11/18/20 have not addressed pelvic pain goals at this time.      Patient will demonstrate independence with HEP and self-management that promotes pelvic floor relaxation and awareness.    Status at Eval: Patient performing no activity for self-care and/or stress/pelvic pain management.   12/2/2020 introduced self management tools for addressing pelvic pain: XS dilator for external trigger point release     Long Term Goals: To be accomplished in 8 weeks:  Pt will get up during the night 0-1x to urinate signifying age-appropriate nocturia and improved sleeping routine.   Eval: 2-3x/ night, always with UUI sometimes having to change pad  12/2/2020 urinating 0-2 times/night , more often 1 time/night ; has not had to change pad.      Patient will demonstrate improvement of current complaints evidenced by a 50%   improvement in FOTO, PFDI score  Status at Eval: PFDI 21  11/18/20  PFDI 21, no change    PLAN  []  Upgrade activities as tolerated     [x]  Continue plan of care  [x]  Update interventions per flow sheet       []  Discharge due to:_  []  Other:_      Tania Ramos, PT 12/2/2020  8:49 AM    Future Appointments   Date Time Provider Howard Larsen   1/11/2021 10:00 AM CHAYO ReddingS BS AMB

## 2020-12-08 ENCOUNTER — HOSPITAL ENCOUNTER (OUTPATIENT)
Dept: PHYSICAL THERAPY | Age: 75
Discharge: HOME | End: 2020-12-08
Payer: MEDICARE

## 2020-12-08 PROCEDURE — 97112 NEUROMUSCULAR REEDUCATION: CPT

## 2020-12-08 PROCEDURE — 97140 MANUAL THERAPY 1/> REGIONS: CPT

## 2020-12-08 NOTE — PROGRESS NOTES
PT DAILY TREATMENT NOTE    Patient Name: Ismael Montana  Date:2020  : 1945  [x]  Patient  Verified  Payor: Georgie Thaos / Plan: [place] / Product Type: Medicare /    In time:1020  Out time [id number]  Total Treatment Time (min): 40  Total Timed Codes (min): 40  1:1 Treatment Time ([facility] only): 40   Visit #: 9 of 16    Treatment Area: Vulvodynia [N94.819]    SUBJECTIVE  Pain Level (0-10 scale): 0  Any medication changes, allergies to medications, adverse drug reactions, diagnosis change, or new procedure performed?: [x] No    [] Yes (see summary sheet for update)  Subjective functional status/changes:   [] No changes reported  Patient reports: compliance with current HEP. Patient states she is hesitant to report that she has not had as many painful episodes of vaginal pain, decrease in frequency but not severity. Has used dilator \"some\" since last session, 5 times since last session.       OBJECTIVE  10 min Neuromuscular Re-education:  [x]  See flow sheet : for downtraining  Pt demo paradoxical contraction in attempt to elongate  Despite VC/TC and visualization still demo poor coordination and awareness; to f/u next session   Rationale: increase ROM, increase strength, improve coordination and increase proprioception  to improve the patients ability to restore full ROM     30 min Manual Therapy:  bilat adductor STM  Superficial PFM STM: bulbo and ischiocavernosus, STP, perineal body trigger point release  Introitus: 1 Dimensional gentle stretch   Rationale: increase ROM, increase tissue extensibility, decrease trigger points and increase postural awareness to improve the patients ability to tolerate progressive manual therapy and downtraining              With   [] TE   [x] TA   [] neuro   [] other: Patient Education: [x] Review HEP    [] Progressed/Changed HEP based on:   [] positioning   [] body mechanics   [] transfers   [] heat/ice application    [] other:      Other Objective/Functional Measures: improved motor recruitment and endurance with kegel     Pain Level (0-10 scale) post treatment: 3    ASSESSMENT/Changes in Function: Patient tolerated today's session well with minimal c/o pain. Patient demonstrated understanding of today's instruction, education, and recommendations. Patient is progressing appropriately towards goals. Patient will continue to benefit from skilled PT services to modify and progress therapeutic interventions, address functional mobility deficits, address ROM deficits, address strength deficits, analyze and address soft tissue restrictions, analyze and cue movement patterns, analyze and modify body mechanics/ergonomics and assess and modify postural abnormalities to attain remaining goals. [x]  See Plan of Care  []  See progress note/recertification  []  See Discharge Summary         Progress towards goals / Updated goals:  Short Term Goals: To be accomplished in 4 weeks:  Patient will demonstrate proper dietary/fluid habits and urge suppression strategies that promote bladder and bowel health to aid in management of urinary urgency and incontinence. Status at eval: Patient consuming irriants and unaware urge suppression strategies.   11/18/20 Issues only present at night time; \"I will have a good spell and then I won't have a good spell\" but still having UUI first thing in the morning when standing up but sometimes able to suppress urge a few minutes longer, about 30% improvement     Patient will tolerate insertion of medium dilator, static, for 15 minutes with pain less than 4/10  Status at eval: all insertion elicits 9/94 pain, unable to have intercourse  11/18/20 have not addressed pelvic pain goals at this time.   12/8/2020 Patient tolerated 3 minutes of vaginal digital insertion today with pain less than 4/10     Patient will demonstrate independence with HEP and self-management that promotes pelvic floor relaxation and awareness.    Status at Eval: Patient performing no activity for self-care and/or stress/pelvic pain management.   12/2/2020 introduced self management tools for addressing pelvic pain: XS dilator for external trigger point release     Long Term Goals: To be accomplished in 8 weeks:  Pt will get up during the night 0-1x to urinate signifying age-appropriate nocturia and improved sleeping routine. Eval: 2-3x/ night, always with UUI sometimes having to change pad  12/2/2020 urinating 0-2 times/night , more often 1 time/night ; has not had to change pad.      Patient will demonstrate improvement of current complaints evidenced by a 50%   improvement in FOTO, PFDI score  Status at Eval: PFDI 21  11/18/20  PFDI 21, no change       PLAN  []  Upgrade activities as tolerated     [x]  Continue plan of care  [x]  Update interventions per flow sheet       []  Discharge due to:_  []  Other:_  downtraining and PFM elongation/NM re-ed mirror?     Cleopatra Sawant, PT 12/8/2020  10:24 AM    Future Appointments   Date Time Provider Howard Larsen   12/15/2020  9:30 AM Catalina Holcomb 149, PT [facility]   12/23/2020  9:30 AM Kelsie Calix, PT [facility]   12/28/2020 10:30 AM Kelsie Calix PT [facility]   1/11/2021 10:00 AM Valentine Lim NP [facility]

## 2020-12-15 ENCOUNTER — HOSPITAL ENCOUNTER (OUTPATIENT)
Dept: PHYSICAL THERAPY | Age: 75
Discharge: HOME | End: 2020-12-15
Payer: MEDICARE

## 2020-12-15 PROCEDURE — 97112 NEUROMUSCULAR REEDUCATION: CPT

## 2020-12-15 PROCEDURE — 97535 SELF CARE MNGMENT TRAINING: CPT

## 2020-12-15 NOTE — PROGRESS NOTES
[facility]  [address], [address] [facility]  Ph [phone]  Fx [phone]    Pelvic Floor Progress Note  Patient name: Steve Martini Start of Care: 10/27/20   Referral source: Thom Muir NP : 1945   Medical/Treatment Diagnosis: Vulvodynia [N94.819] Onset Date:about 20 years ago     Prior Hospitalization: see medical history Provider#: [id number]   Medications: Verified on Patient Summary List    Comorbidities: lichen sclerosis, 2 vaginal deliveries   Prior Level of Function: Prior to menopause approximately 20 years ago no vaginal or urinary pain or issues    Visits from Start of Care: 10    Missed Visits: 0    Established Goals:           Excellent Good         Limited           None  [x] Increase Pelvic MM strength       []  []  [x]  []  [x] Decrease Pelvic MM hypertonus     []  []  [x]  []  [x] Decrease Incontinence Episodes    [x]  [x]  []  []   [x] Improve Voiding Habits        [x]  []  []  []   [x] Decreased Urgency        [x]  [x]  []  []  [x] Decrease Pelvic Pain        []  [x]  [x]  []    Key Functional Changes: Patient reports overall 60% improvement in urinary, bowel and pelvic pain goals since starting PFPT. Patient reports having no notable pelvic pain in the last 7 days, not waking up at night 2/2 pain. Significant decrease in UUI and urgency/frequency    Updated Goals: to be achieved in 8 weeks:  Short Term Goals: To be accomplished in 4 weeks:  Patient will demonstrate proper dietary/fluid habits and urge suppression strategies that promote bladder and bowel health to aid in management of urinary urgency and incontinence.   Status at eval: Patient consuming irriants and unaware urge suppression strategies.   20 Issues only present at night time; \"I will have a good spell and then I won't have a good spell\" but still having UUI first thing in the morning when standing up but sometimes able to suppress urge a few minutes longer, about 30% improvement  12/15/2020 Patient reports 50% improvement in UUI : not getting up as much at night and sometimes getting there without problem.      Patient will tolerate insertion of medium dilator, static, for 15 minutes with pain less than 4/10  Status at eval: all insertion elicits 0/14 pain, unable to have intercourse  11/18/20 have not addressed pelvic pain goals at this time.   12/15/2020 Patient using dilator for external trigger point release and graded exposure 1-2 times/day 5-10 minutes 4-5/10 pain; however pt reports overall decrease in pain throughout the day and night time: pain not waking her up at night in the last week, and overall throughout the day no notable daytime vaginal pain.      Patient will demonstrate independence with HEP and self-management that promotes pelvic floor relaxation and awareness. Status at Eval: Patient performing no activity for self-care and/or stress/pelvic pain management.   12/15/2020 Dilating external release self management and PFM downtraining progressing     Long Term Goals: To be accomplished in 8 weeks:  Pt will get up during the night 0-1x to urinate signifying age-appropriate nocturia and improved sleeping routine.   Eval: 2-3x/ night, always with UUI sometimes having to change pad  12/15/2020 1x/night to urinate about 60% of the time with UUI; alternative strategies reviewed today     Patient will demonstrate improvement of current complaints evidenced by a 50%   improvement in FOTO, PFDI score  Status at Eval: PFDI 21  11/18/20  PFDI 21, no change  12/15/2020 PFDI 21, no change however overall progress reports 60% improvement    ASSESSMENT/RECOMMENDATIONS:  [x]Continue therapy per initial plan/protocol at a frequency of  2 x per week for 8 weeks  []Continue therapy with the following recommended changes:_____________________      _____________________________________________________________________  []Discontinue therapy progressing towards or have reached established goals  []Discontinue therapy due to lack of appreciable progress towards goals  []Discontinue therapy due to lack of attendance or compliance  []Await Physician's recommendations/decisions regarding therapy  []Other:________________________________________________________________    Thank you for this referral.   Latisha Longo, PT 12/15/2020 11:04 AM  NOTE TO PHYSICIAN:  [address]   [facility]: [phone]  If you are unable to process this request in 24 hours please contact our office: [phone]      ? I have read the above report and request that my patient continue as recommended. ? I have read the above report and request that my patient continue therapy with the following changes/special instructions:___________________________________________________________  ? I have read the above report and request that my patient be discharged from therapy.

## 2020-12-15 NOTE — PROGRESS NOTES
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 States she put up [place] tree yesterday and can hardly move today. States she has not had much pain in general, \"not enough to draw attention to it\" States \"there is no way I can get on that table today\" 2/2 overall soreness from yesterdays activities. See prog note from today  Patient arrives 15 minutes late    OBJECTIVE      20 min Self Care: Thorough review and handout provided on the following: urge suppression alternatives reviewed; discussed goals and plan for future treatment; ed provided throughout todays session   Rationale:  Increase awareness and understanding of current condition to improve patients ability to independently and effectively attain goals and progress towards long term management of current condition.        10 min Neuromuscular Re-education:  [x]  See flow sheet : downtraining PFM sitting: contract/relax/elognation with diaphragmatic breathing and elongation   Rationale: increase ROM, improve coordination and increase proprioception  to improve the patients ability to decrease pelvic pain and tension to progress towards long term pain and functional goals              With   [] TE   [] TA   [x] neuro   [] other: Patient Education: [x] Review HEP    [] Progressed/Changed HEP based on:   [] positioning   [] body mechanics   [] transfers   [] heat/ice application    [] other:        Pain Level (0-10 scale) post treatment: 6    ASSESSMENT/Changes in Function: Patient tolerated today's session well with decrease c/o pain. Patient demonstrated understanding of today's instruction, education, and recommendations. Patient is progressing appropriately towards goals. Patient has noted a significant reduction in overall pelvic pain over the course of the last 1-2 weeks. Patient will continue to benefit from skilled PT services to modify and progress therapeutic interventions, address functional mobility deficits, address ROM deficits, address strength deficits, analyze and address soft tissue restrictions, analyze and cue movement patterns, analyze and modify body mechanics/ergonomics and assess and modify postural abnormalities to attain remaining goals. []  See Plan of Care  [x]  See progress note/recertification  []  See Discharge Summary         Progress towards goals / Updated goals:  Short Term Goals: To be accomplished in 4 weeks:  Patient will demonstrate proper dietary/fluid habits and urge suppression strategies that promote bladder and bowel health to aid in management of urinary urgency and incontinence.   Status at eval: Patient consuming irriants and unaware urge suppression strategies.   11/18/20 Issues only present at night time; \"I will have a good spell and then I won't have a good spell\" but still having UUI first thing in the morning when standing up but sometimes able to suppress urge a few minutes longer, about 30% improvement  12/15/2020 Patient reports 50% improvement in UUI : not getting up as much at night and sometimes getting there without problem.      Patient will tolerate insertion of medium dilator, static, for 15 minutes with pain less than 4/10  Status at eval: all insertion elicits 7/14 pain, unable to have intercourse  11/18/20 have not addressed pelvic pain goals at this time.   12/15/2020 Patient using dilator for external trigger point release and graded exposure 1-2 times/day 5-10 minutes 4-5/10 pain; however pt reports overall decrease in pain throughout the day and night time: pain not waking her up at night in the last week, and overall throughout the day no notable daytime vaginal pain.      Patient will demonstrate independence with HEP and self-management that promotes pelvic floor relaxation and awareness. Status at Eval: Patient performing no activity for self-care and/or stress/pelvic pain management.   12/15/2020 Dilating external release self management and PFM downtraining progressing     Long Term Goals: To be accomplished in 8 weeks:  Pt will get up during the night 0-1x to urinate signifying age-appropriate nocturia and improved sleeping routine. Eval: 2-3x/ night, always with UUI sometimes having to change pad  12/15/2020 1x/night to urinate about 60% of the time with UUI; strategies reviewed today     Patient will demonstrate improvement of current complaints evidenced by a 50%   improvement in FOTO, PFDI score  Status at Eval: PFDI 21  11/18/20  PFDI 21, no change  12/15/2020 PFDI 21, no change however overall progress reports 60% improvement           PLAN  []  Upgrade activities as tolerated     [x]  Continue plan of care  [x]  Update interventions per flow sheet       []  Discharge due to:_  []  Other:_  downtraining and PFM elongation/NM re-ed mirror?      Avinash Taylor, PT 12/15/2020  9:44 AM    Future Appointments   Date Time Provider Howard Larsen   12/23/2020  9:30 AM Linh Holcomb, PT [facility]   12/28/2020 10:30 AM Cielo Lu, PT [facility]   1/11/2021 10:00 AM CHAYO Patel BS AMB

## 2020-12-23 ENCOUNTER — HOSPITAL ENCOUNTER (OUTPATIENT)
Dept: PHYSICAL THERAPY | Age: 75
Discharge: HOME | End: 2020-12-23
Payer: MEDICARE

## 2020-12-23 PROCEDURE — 97110 THERAPEUTIC EXERCISES: CPT

## 2020-12-23 PROCEDURE — 97112 NEUROMUSCULAR REEDUCATION: CPT

## 2020-12-23 NOTE — PROGRESS NOTES
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 Patient states she has not had any vaginal pain \"that I know of\" but reports back and legs are bothersome at night. States she has been sitting a lot wrapping packages, so made an effort to get up and move around more. OBJECTIVE    15 min Therapeutic Exercise:  [x] See flow sheet :   Rationale: increase strength to improve the patients ability to improve functional mobility and return to PLOF    25 min Neuromuscular Re-education:  [x]  See flow sheet : TrA 20% LP neutral in standing and sitting, with gentle pain free LE activities   Rationale: increase strength, improve coordination and increase proprioception  to improve the patients ability to progress towards functional strengthening and decrease pain with ADLs          With   [x] TE   [] TA   [x] neuro   [] other: Patient Education: [x] Review HEP    [] Progressed/Changed HEP based on:   [] positioning   [] body mechanics   [] transfers   [] heat/ice application    [] other:         Pain Level (0-10 scale) post treatment: 0    ASSESSMENT/Changes in Function: Patient tolerated today's session well with no c/o pain. Patient demonstrated understanding of today's instruction, education, and recommendations. Patient is progressing appropriately towards goals.  Patient reported decreased back pain with TrA activation in spinal neutral    Patient will continue to benefit from skilled PT services to modify and progress therapeutic interventions, address functional mobility deficits, address ROM deficits, address strength deficits, analyze and address soft tissue restrictions, analyze and cue movement patterns, analyze and modify body mechanics/ergonomics and assess and modify postural abnormalities to attain remaining goals. [x]  See Plan of Care  []  See progress note/recertification  []  See Discharge Summary         Progress towards goals / Updated goals:  Short Term Goals: To be accomplished in 4 weeks:  Patient will demonstrate proper dietary/fluid habits and urge suppression strategies that promote bladder and bowel health to aid in management of urinary urgency and incontinence.   Status at eval: Patient consuming irriants and unaware urge suppression strategies.   11/18/20 Issues only present at night time; \"I will have a good spell and then I won't have a good spell\" but still having UUI first thing in the morning when standing up but sometimes able to suppress urge a few minutes longer, about 30% improvement  12/15/2020 Patient reports 50% improvement in UUI : not getting up as much at night and sometimes getting there without problem.      Patient will tolerate insertion of medium dilator, static, for 15 minutes with pain less than 4/10  Status at eval: all insertion elicits 1/06 pain, unable to have intercourse  11/18/20 have not addressed pelvic pain goals at this time.   12/15/2020 Patient using dilator for external trigger point release and graded exposure 1-2 times/day 5-10 minutes 4-5/10 pain; however pt reports overall decrease in pain throughout the day and night time: pain not waking her up at night in the last week, and overall throughout the day no notable daytime vaginal pain.      Patient will demonstrate independence with HEP and self-management that promotes pelvic floor relaxation and awareness. Status at Eval: Patient performing no activity for self-care and/or stress/pelvic pain management.   12/23/2020 intro to core stabs today    Long Term Goals: To be accomplished in 8 weeks:  Pt will get up during the night 0-1x to urinate signifying age-appropriate nocturia and improved sleeping routine.   Eval: 2-3x/ night, always with UUI sometimes having to change pad  12/15/2020 1x/night to urinate about 60% of the time with UUI; alternative strategies reviewed today     Patient will demonstrate improvement of current complaints evidenced by a 50%   improvement in FOTO, PFDI score  Status at Eval: PFDI 21  11/18/20  PFDI 21, no change  12/15/2020 PFDI 21, no change however overall progress reports 60% improvement       PLAN  []  Upgrade activities as tolerated     [x]  Continue plan of care  [x]  Update interventions per flow sheet       []  Discharge due to:_  []  Other:_      Olga Lidia Yun, PT 12/23/2020  9:49 AM    Future Appointments   Date Time Provider Howard Larsen   12/28/2020 10:30 AM Lupillo Holcomb, PT [facility]   1/12/2021  1:15 PM Radha Vickers MD [facility] BS AMB

## 2020-12-28 ENCOUNTER — HOSPITAL ENCOUNTER (OUTPATIENT)
Dept: PHYSICAL THERAPY | Age: 75
Discharge: HOME | End: 2020-12-28
Payer: MEDICARE

## 2020-12-28 PROCEDURE — 97530 THERAPEUTIC ACTIVITIES: CPT

## 2020-12-28 PROCEDURE — 97110 THERAPEUTIC EXERCISES: CPT

## 2020-12-28 NOTE — PROGRESS NOTES
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 Vaginal pain continues to decrease; has only had one episode of vaginal pain since last session. No longer waking at night to urinate \"which is remarkable\" and now c/o only time having no control over urine is as she is sitting on toilet, not having accident but feels she cannot control it from coming out while sitting down.      OBJECTIVE    20 min Therapeutic Exercise:  [x] See flow sheet : seated/standing hip ABD/ADD   Rationale: increase strength and increase proprioception to improve the patients ability to increase core strength and control      25 min Therapeutic Activity:  [x]  See flow sheet : mass practice at varying heights STS and reverse kegel with proper sitting/standing form; performed at EOB high, and lowering to toilet height also at ladder with UE support for improved proprioception   Rationale: increase strength, improve coordination and increase proprioception  to improve the patients ability to control urine with all transitions             With   [] TE   [x] TA   [] neuro   [] other: Patient Education: [x] Review HEP    [] Progressed/Changed HEP based on:   [] positioning   [] body mechanics   [] transfers   [] heat/ice application    [] other:         Pain Level (0-10 scale) post treatment: 0    ASSESSMENT/Changes in Function: Patient tolerated today's session well with no c/o pain. Patient demonstrated understanding of today's instruction, education, and recommendations. Patient is progressing appropriately towards goals. Patient demo improved form and awareness with todays theract. Patient will continue to benefit from skilled PT services to modify and progress therapeutic interventions, address functional mobility deficits, address ROM deficits, address strength deficits, analyze and address soft tissue restrictions, analyze and cue movement patterns, analyze and modify body mechanics/ergonomics and assess and modify postural abnormalities to attain remaining goals. [x]  See Plan of Care  []  See progress note/recertification  []  See Discharge Summary         Progress towards goals / Updated goals:  Short Term Goals: To be accomplished in 4 weeks:  Patient will demonstrate proper dietary/fluid habits and urge suppression strategies that promote bladder and bowel health to aid in management of urinary urgency and incontinence. Status at eval: Patient consuming irriants and unaware urge suppression strategies.   12/28/2020 several nights not waking up to urinate; no UUI in a week.      Patient will tolerate insertion of medium dilator, static, for 15 minutes with pain less than 4/10  Status at eval: all insertion elicits 5/66 pain, unable to have intercourse  11/18/20 have not addressed pelvic pain goals at this time.   12/15/2020 Patient using dilator for external trigger point release and graded exposure 1-2 times/day 5-10 minutes 4-5/10 pain; however pt reports overall decrease in pain throughout the day and night time: pain not waking her up at night in the last week, and overall throughout the day no notable daytime vaginal pain.      Patient will demonstrate independence with HEP and self-management that promotes pelvic floor relaxation and awareness.    Status at Eval: Patient performing no activity for self-care and/or stress/pelvic pain management.   12/23/2020 intro to core stabs today     Long Term Goals: To be accomplished in 8 weeks:  Pt will get up during the night 0-1x to urinate signifying age-appropriate nocturia and improved sleeping routine. Eval: 2-3x/ night, always with UUI sometimes having to change pad  12/15/2020 1x/night to urinate about 60% of the time with UUI; alternative strategies reviewed today     Patient will demonstrate improvement of current complaints evidenced by a 50%   improvement in FOTO, PFDI score  Status at Eval: PFDI 21  11/18/20  PFDI 21, no change  12/15/2020 PFDI 21, no change however overall progress reports 60% improvement    PLAN  []  Upgrade activities as tolerated     [x]  Continue plan of care  [x]  Update interventions per flow sheet       []  Discharge due to:_  []  Other:_  Pelvic pain goals/prog next session; FOTO in session.      Jey Nix, PT 12/28/2020  10:33 AM    Future Appointments   Date Time Provider Howard Larsen   1/5/2021 12:00 PM Gabriela Oliveira [facility]   1/7/2021 10:15 AM Lilia Duverney, Nechama Cardinal, [facility]   1/12/2021 12:00 PM Gabriela Oliveira [facility]   1/12/2021  1:15 PM Jaylen Peoples MD [facility]   1/14/2021 10:15 AM Gabriela Oliveira [facility]   1/19/2021 10:15 AM Gabriela Oliveira [facility]   1/21/2021 11:00 AM Gabriela Oliveira [facility]

## 2021-01-05 ENCOUNTER — APPOINTMENT (OUTPATIENT)
Dept: PHYSICAL THERAPY | Age: 76
End: 2021-01-05
Payer: MEDICARE

## 2021-01-07 ENCOUNTER — APPOINTMENT (OUTPATIENT)
Dept: PHYSICAL THERAPY | Age: 76
End: 2021-01-07
Payer: MEDICARE

## 2021-01-12 ENCOUNTER — HOSPITAL ENCOUNTER (OUTPATIENT)
Dept: PHYSICAL THERAPY | Age: 76
Discharge: HOME | End: 2021-01-12
Payer: MEDICARE

## 2021-01-12 ENCOUNTER — OFFICE VISIT (OUTPATIENT)
Dept: ONCOLOGY | Age: 76
End: 2021-01-12
Payer: MEDICARE

## 2021-01-12 VITALS
TEMPERATURE: 97.8 F | SYSTOLIC BLOOD PRESSURE: 151 MMHG | HEIGHT: 66 IN | WEIGHT: 239.4 LBS | BODY MASS INDEX: 38.47 KG/M2 | OXYGEN SATURATION: 95 % | DIASTOLIC BLOOD PRESSURE: 78 MMHG | HEART RATE: 69 BPM

## 2021-01-12 DIAGNOSIS — L90.0 LICHEN SCLEROSUS ET ATROPHICUS: ICD-10-CM

## 2021-01-12 DIAGNOSIS — R10.2 VULVOVAGINAL PAIN: Primary | ICD-10-CM

## 2021-01-12 PROCEDURE — G0463 HOSPITAL OUTPT CLINIC VISIT: HCPCS | Performed by: OBSTETRICS & GYNECOLOGY

## 2021-01-12 PROCEDURE — 97140 MANUAL THERAPY 1/> REGIONS: CPT

## 2021-01-12 PROCEDURE — G8427 DOCREV CUR MEDS BY ELIG CLIN: HCPCS | Performed by: OBSTETRICS & GYNECOLOGY

## 2021-01-12 PROCEDURE — G8536 NO DOC ELDER MAL SCRN: HCPCS | Performed by: OBSTETRICS & GYNECOLOGY

## 2021-01-12 PROCEDURE — G8432 DEP SCR NOT DOC, RNG: HCPCS | Performed by: OBSTETRICS & GYNECOLOGY

## 2021-01-12 PROCEDURE — 1101F PT FALLS ASSESS-DOCD LE1/YR: CPT | Performed by: OBSTETRICS & GYNECOLOGY

## 2021-01-12 PROCEDURE — 99213 OFFICE O/P EST LOW 20 MIN: CPT | Performed by: OBSTETRICS & GYNECOLOGY

## 2021-01-12 PROCEDURE — 97112 NEUROMUSCULAR REEDUCATION: CPT

## 2021-01-12 PROCEDURE — 3017F COLORECTAL CA SCREEN DOC REV: CPT | Performed by: OBSTETRICS & GYNECOLOGY

## 2021-01-12 PROCEDURE — 1090F PRES/ABSN URINE INCON ASSESS: CPT | Performed by: OBSTETRICS & GYNECOLOGY

## 2021-01-12 PROCEDURE — G8417 CALC BMI ABV UP PARAM F/U: HCPCS | Performed by: OBSTETRICS & GYNECOLOGY

## 2021-01-12 PROCEDURE — 97535 SELF CARE MNGMENT TRAINING: CPT

## 2021-01-12 PROCEDURE — G8400 PT W/DXA NO RESULTS DOC: HCPCS | Performed by: OBSTETRICS & GYNECOLOGY

## 2021-01-12 NOTE — PROGRESS NOTES
[facility]  [address], [address] [facility]  Ph [phone]  Fx [phone]    Pelvic Floor Progress Note  Patient name: Geoff Padilla Start of Care: 10/27/2020   Referral source: Nasim Rodriguez NP : 1945   Medical/Treatment Diagnosis: Muscle wasting and atrophy, not elsewhere classified, other site [M62.58] Onset Date:20 years ago     Prior Hospitalization: see medical history Provider#: [id number]   Medications: Verified on Patient Summary List    Comorbidities: lichen sclerosis, 2 vaginal deliveries   Prior Level of Function: Prior to menopause approximately 20 years ago no vaginal or urinary pain or issues    Visits from Start of Care: 13    Missed Visits: 0    Established Goals:           Excellent Good         Limited           None  [x] Increase Pelvic MM strength       []  [x]  []  []  [x] Decrease Pelvic MM hypertonus     []  [x]  []  []  [x] Decrease Incontinence Episodes    [x]  []  []  []   [x] Improve Voiding Habits        [x]  []  []  []   [x] Decreased Urgency        [x]  []  []  []  [x] Decrease Pelvic Pain        []  [x]  []  []    Key Functional Changes: Patient reports overall 75% improvement in vaginal pain complaints; only having brief infrequent episodes, no longer interfering with sleep or daytime activities. Patient no longer having urinary issues; waking 0-1 times/night. Progressing now towards pelvic pain/intercourse/insertional goals    Updated Goals: to be achieved in 4 weeks:    Patient will demonstrate proper dietary/fluid habits and urge suppression strategies that promote bladder and bowel health to aid in management of urinary urgency and incontinence. Status at eval: Patient consuming irriants and unaware urge suppression strategies.    2021 NO UI; GOAL MET      Patient will tolerate insertion of medium dilator, static, for 15 minutes with pain less than 4/10  Status at eval: all insertion elicits 9/40 pain, unable to have intercourse   1/12/2021 Now addressing pelvic pain goals; patient tolerated pelvic floor manual, one dimensional stretch and trigger point release today 3/10; has dilator for self release, not yet used; PROGRESSING TOWARDS GOAL     Patient will demonstrate independence with HEP and self-management that promotes pelvic floor relaxation and awareness. Status at Eval: Patient performing no activity for self-care and/or stress/pelvic pain management.   1/12/2021 Patient is moderately compliant with HEP and management tools, PROGRESSING TOWARDS GOAL       Pt will get up during the night 0-1x to urinate signifying age-appropriate nocturia and improved sleeping routine. Eval: 2-3x/ night, always with UUI sometimes having to change pad  1/12/2021 Now waking 0-1x/night; GOAL MET     Patient will demonstrate improvement of current complaints evidenced by a 50%   improvement in FOTO, PFDI score  Status at Eval: PFDI 21  1/12/2021  PFDI 14; 17% limitation, PROGRESSING TOWARDS PELVIC PAIN GOALS  ASSESSMENT/RECOMMENDATIONS:  [x]Continue therapy per initial plan/protocol at a frequency of  2 x per week for 4 weeks  []Continue therapy with the following recommended changes:_____________________      _____________________________________________________________________  []Discontinue therapy progressing towards or have reached established goals  []Discontinue therapy due to lack of appreciable progress towards goals  []Discontinue therapy due to lack of attendance or compliance  []Await Physician's recommendations/decisions regarding therapy  []Other:________________________________________________________________    Thank you for this referral.   Jamie Cervantes, PT 1/12/2021 12:04 PM  NOTE TO PHYSICIAN:  PLEASE COMPLETE THE ORDERS BELOW AND   FAX TO [facility]: [phone]  If you are unable to process this request in 24 hours please contact our office: [phone]      ?  I have read the above report and request that my patient continue as recommended. ? I have read the above report and request that my patient continue therapy with the following changes/special instructions:___________________________________________________________  ? I have read the above report and request that my patient be discharged from therapy.

## 2021-01-12 NOTE — PROGRESS NOTES
PT DAILY TREATMENT NOTE    Patient Name: Benedict Fall  Date:2021  : 1945  [x]  Patient  Verified  Payor: Mervat Coburn / Plan: [place] MEDICARE PART A & B / Product Type: Medicare /    In time:1200  Out time:1245  Total Treatment Time (min): 45  Total Timed Codes (min): 45  1:1 Treatment Time ( [address] only): 39   Visit #: 13 of 44    Treatment Area: Muscle wasting and atrophy, not elsewhere classified, other site [M62.58]    SUBJECTIVE  Pain Level (0-10 scale): 0  Any medication changes, allergies to medications, adverse drug reactions, diagnosis change, or new procedure performed?: [x] No    [] Yes (see summary sheet for update)  Subjective functional status/changes:   [] No changes reported  Patient reports: compliance with current HEP. Patient states she has had 1-2 episodes of pain since last session     OBJECTIVE      20 min Self Care: Thorough review and handout provided on the following: vaginal lubricants and dilator use, graded exposure, rationale and expectations for pain with intercourse goals; Ask MD luis Pitts Kinds hydration cubes  Reviewed progress and goals/plan to date  Discussed weight gain/loss and prolapse implications   Rationale:  Increase awareness and understanding of current condition to improve patients ability to independently and effectively attain goals and progress towards long term management of current condition.      10 min Neuromuscular Re-education:  [x]  See flow sheet : PFM downtraining contract/relax/pushaway  Pt demo difficulty with push, to f/u next session   Rationale: increase ROM, improve coordination and increase proprioception  to improve the patients ability to tolerate manual and functional goals with less pain; decrease pelvic pain    15 min Manual Therapy:  Introitus 1 dimensional stretch; gentle cervix mobs however ceased 2/2 pt report of discomfort   Rationale: decrease pain, increase ROM and increase tissue extensibility to improve the patients ability to progress towards pelvic pain goals          With   [] TE   [] TA   [x] neuro   [] other: Patient Education: [x] Review HEP    [] Progressed/Changed HEP based on:   [] positioning   [] body mechanics   [] transfers   [] heat/ice application    [] other:      Other Objective/Functional Measures: cervix low in vaginal vault    Pain Level (0-10 scale) post treatment: 1    ASSESSMENT/Changes in Function: Patient tolerated today's session well with no c/o pain. Patient demonstrated understanding of today's instruction, education, and recommendations. Patient is progressing appropriately towards goals. Patient demo improved tolerance for manual therapy however will benefit from prolapse group exercises. Patient will continue to benefit from skilled PT services to modify and progress therapeutic interventions, address functional mobility deficits, address ROM deficits, address strength deficits, analyze and address soft tissue restrictions, analyze and cue movement patterns, analyze and modify body mechanics/ergonomics and assess and modify postural abnormalities to attain remaining goals. [x]  See Plan of Care  []  See progress note/recertification  []  See Discharge Summary         Progress towards goals / Updated goals:  t Term Goals: To be accomplished in 4 weeks:  Patient will demonstrate proper dietary/fluid habits and urge suppression strategies that promote bladder and bowel health to aid in management of urinary urgency and incontinence. Status at eval: Patient consuming irriants and unaware urge suppression strategies.    1/12/2021 NO UI; GOAL MET      Patient will tolerate insertion of medium dilator, static, for 15 minutes with pain less than 4/10  Status at eval: all insertion elicits 8/18 pain, unable to have intercourse   1/12/2021 Now addressing pelvic pain goals; patient tolerated pelvic floor manual, one dimensional stretch and trigger point release today 3/10; has dilator for self release, not yet used; PROGRESSING TOWARDS GOAL     Patient will demonstrate independence with HEP and self-management that promotes pelvic floor relaxation and awareness. Status at Eval: Patient performing no activity for self-care and/or stress/pelvic pain management.   1/12/2021 Patient is moderately compliant with HEP and management tools, PROGRESSING TOWARDS GOAL     Long Term Goals: To be accomplished in 8 weeks:  Pt will get up during the night 0-1x to urinate signifying age-appropriate nocturia and improved sleeping routine. Eval: 2-3x/ night, always with UUI sometimes having to change pad  1/12/2021 Now waking 0-1x/night; GOAL MET    Patient will demonstrate improvement of current complaints evidenced by a 50%   improvement in FOTO, PFDI score  Status at Eval: PFDI 21  1/12/2021  PFDI 14; 17% limitation, PROGRESSING TOWARDS PELVIC PAIN GOALS    PLAN  []  Upgrade activities as tolerated     [x]  Continue plan of care  [x]  Update interventions per flow sheet       []  Discharge due to:_  []  Other:_  Prolapse group?  Pelvic pain continue    Balbina Chavira, PT 1/12/2021  12:03 PM    Future Appointments   Date Time Provider Howard Larsen   1/12/2021  1:15 PM Demian Cai MD [facility]   1/14/2021 10:15 AM Juan Daniel Adams, PT [facility]   1/19/2021 10:15 AM Carron Gaucher, Angeline Green, PT [facility]   1/21/2021 11:00 AM Juan Daniel Adams, [facility]

## 2021-01-12 NOTE — PROGRESS NOTES
Francisco Javier Guerrero is a 70 y.o. female presents in office for surveillance Lichen. Chief Complaint   Patient presents with    Follow-up      Pt has no complaints  Do you have any unusual vaginal bleeding, discharge or irritation? no  Do you have any changes in your bowel movements? no  Have you been experiencing nausea or vomiting? no  Have you been experiencing any continuous or worsening abdominal pain? no  Any urinary burning? no    Visit Vitals  BP (!) 151/78 (BP 1 Location: Left arm, BP Patient Position: Sitting)   Pulse 69   Temp 97.8 °F (36.6 °C) (Temporal)   Ht 5' 6\" (1.676 m)   Wt 239 lb 6.4 oz (108.6 kg)   SpO2 95%   BMI 38.64 kg/m²         1. Have you been to the ER, urgent care clinic since your last visit? Hospitalized since your last visit? No    2. Have you seen or consulted any other health care providers outside of the [address] [place] [address] since your last visit? Include any pap smears or colon screening. No    3 most recent PHQ Screens 3/24/2020   Little interest or pleasure in doing things Not at all   Feeling down, depressed, irritable, or hopeless Not at all   Total Score PHQ 2 0       No flowsheet data found. Fall Risk Assessment, last 12 mths 1/12/2021   Able to walk? Yes   Fall in past 12 months? 0   Do you feel unsteady?  0   Are you worried about falling 0       Learning Assessment 3/24/2020   PRIMARY LEARNER Patient   BARRIERS PRIMARY LEARNER NONE   CO-LEARNER CAREGIVER No   PRIMARY LANGUAGE ENGLISH   LEARNER PREFERENCE PRIMARY DEMONSTRATION   ANSWERED BY Patient   RELATIONSHIP SELF

## 2021-01-12 NOTE — LETTER
1/12/2021 Patient: Royal White YOB: 1945 Date of Visit: 1/12/2021 Mickey Deleon MD 
[address] [place] [phone] [address] Fax: [phone] Dear Mickey Deleon MD, [facility]. My notes for this consultation are attached. If you have questions, please do not hesitate to call me. I look forward to following your patient along with you. Sincerely, Nilay Bae MD

## 2021-01-14 ENCOUNTER — HOSPITAL ENCOUNTER (OUTPATIENT)
Dept: PHYSICAL THERAPY | Age: 76
Discharge: HOME | End: 2021-01-14
Payer: MEDICARE

## 2021-01-14 PROCEDURE — 97112 NEUROMUSCULAR REEDUCATION: CPT

## 2021-01-14 PROCEDURE — 97110 THERAPEUTIC EXERCISES: CPT

## 2021-01-14 NOTE — PROGRESS NOTES
PT DAILY TREATMENT NOTE    Patient Name: Kain Flores  Date:2021  : 1945  [x]  Patient  Verified  Payor: [place] MEDICARE / Plan: [place] MEDICARE PART A & B / Product Type: Medicare /    In time:1015  Out time:1100  Total Treatment Time (min): 45  Total Timed Codes (min): 45  1:1 Treatment Time ( [address] only): 39   Visit #: 14 of 44    Treatment Area: Muscle wasting and atrophy, not elsewhere classified, other site [M62.58]    SUBJECTIVE  Pain Level (0-10 scale): 0  Any medication changes, allergies to medications, adverse drug reactions, diagnosis change, or new procedure performed?: [x] No    [] Yes (see summary sheet for update)  Subjective functional status/changes:   [] No changes reported  Patient reports:  compliance with current HEP. Patient states that referring MD confirmed uterine prolapse    OBJECTIVE    30 min Therapeutic Exercise:  [x] See flow sheet :   Rationale: increase strength and increase proprioception to improve the patients ability to reduce prolapse s/s      15 min Neuromuscular Re-education:  [x]  See flow sheet : coordination of TrA, exhale and exercise including kegel   Rationale: increase strength, improve coordination and increase proprioception  to improve the patients ability to perform ADLS and functional exercises with proper form          With   [] TE   [] TA   [x] neuro   [] other: Patient Education: [x] Review HEP    [] Progressed/Changed HEP based on:   [] positioning   [] body mechanics   [] transfers   [] heat/ice application    [] other:         Pain Level (0-10 scale) post treatment: 0    ASSESSMENT/Changes in Function: Patient tolerated today's session well with no c/o pain. Patient demonstrated understanding of today's instruction, education, and recommendations. Patient is progressing appropriately towards goals. Patient demo improved functional and bed mobility following todays NM re-ed and therex.      Patient will continue to benefit from skilled PT services to modify and progress therapeutic interventions, address functional mobility deficits, address ROM deficits, address strength deficits, analyze and address soft tissue restrictions, analyze and cue movement patterns, analyze and modify body mechanics/ergonomics and assess and modify postural abnormalities to attain remaining goals. [x]  See Plan of Care  []  See progress note/recertification  []  See Discharge Summary         Progress towards goals / Updated goals:  Term Goals: To be accomplished in 4 weeks:  Patient will demonstrate proper dietary/fluid habits and urge suppression strategies that promote bladder and bowel health to aid in management of urinary urgency and incontinence. Status at eval: Patient consuming irriants and unaware urge suppression strategies.    1/12/2021 NO UI; GOAL MET      Patient will tolerate insertion of medium dilator, static, for 15 minutes with pain less than 4/10  Status at eval: all insertion elicits 1/53 pain, unable to have intercourse   1/12/2021 Now addressing pelvic pain goals; patient tolerated pelvic floor manual, one dimensional stretch and trigger point release today 3/10; has dilator for self release, not yet used; PROGRESSING TOWARDS GOAL  1/14/2021 patient has purchased vaginal lubricant suppositories and plans to work towards pain goals     Patient will demonstrate independence with HEP and self-management that promotes pelvic floor relaxation and awareness. Status at Eval: Patient performing no activity for self-care and/or stress/pelvic pain management.   1/12/2021 Patient is moderately compliant with HEP and management tools, PROGRESSING TOWARDS GOAL     Pt will get up during the night 0-1x to urinate signifying age-appropriate nocturia and improved sleeping routine.   Eval: 2-3x/ night, always with UUI sometimes having to change pad  1/12/2021 Now waking 0-1x/night; GOAL MET     Patient will demonstrate improvement of current complaints evidenced by a 50%   improvement in FOTO, PFDI score  Status at Eval: PFDI 21  1/12/2021  PFDI 14; 17% limitation, PROGRESSING TOWARDS PELVIC PAIN GOALS    PLAN  []  Upgrade activities as tolerated     [x]  Continue plan of care  [x]  Update interventions per flow sheet       []  Discharge due to:_  []  Other:_      Darcy Ospina PT 1/14/2021  10:20 AM    Future Appointments   Date Time Provider Howard Larsen   1/19/2021 10:15 AM Corina Isaac, PT [facility]   1/21/2021 11:00 AM Arvin Holcomb, PT [facility]   7/13/2021 10:00 AM MD MAGAN Read AMB

## 2021-01-19 ENCOUNTER — HOSPITAL ENCOUNTER (OUTPATIENT)
Dept: PHYSICAL THERAPY | Age: 76
Discharge: HOME | End: 2021-01-19
Payer: MEDICARE

## 2021-01-19 PROCEDURE — 97535 SELF CARE MNGMENT TRAINING: CPT

## 2021-01-19 NOTE — PROGRESS NOTES
PT DAILY TREATMENT NOTE    Patient Name: Luis Jewell  Date:2021  : 1945  [x]  Patient  Verified  Payor: [place] MED[place] / Plan: [place] & B / Product Type: Medicare /    In time:1015  Out time:1100  Total Treatment Time (min): 45  Total Timed Codes (min): 45  1:1 Treatment Time ([facility] only): 39   Visit #: 23 of 44    Treatment Area: Muscle wasting and atrophy, not elsewhere classified, other site [M62.58]    SUBJECTIVE  Pain Level (0-10 scale): 0  Any medication changes, allergies to medications, adverse drug reactions, diagnosis change, or new procedure performed?: [x] No    [] Yes (see summary sheet for update)  Subjective functional status/changes:   [] No changes reported  Patient reports: compliance with current HEP. Patient requesting information today on self-management tools and plan ie dilator and how to progress with pain goals. OBJECTIVE    45 min Self Care: Thorough review and handout provided on the following: dilator sets for vaginismus, pelvic wand, water based lubricants, and instructions reviewed and provided for each; reviewed in session with sample products  Goal to return to PFPT in 2 weeks having worked up to S+ dilator dynamic insertion as tolerated, 15 minutes 3x/week   Rationale:  Increase awareness and understanding of current condition to improve patients ability to independently and effectively attain goals and progress towards long term management of current condition. With   [] TE   [] TA   [] neuro   [x] other: Patient Education: [x] Review HEP    [] Progressed/Changed HEP based on:   [] positioning   [] body mechanics   [] transfers   [] heat/ice application    [] other:         Pain Level (0-10 scale) post treatment: 0    ASSESSMENT/Changes in Function: Patient tolerated today's session well with no c/o pain. Patient demonstrated understanding of today's instruction, education, and recommendations.  Patient is progressing appropriately towards goals    Patient will continue to benefit from skilled PT services to modify and progress therapeutic interventions, address functional mobility deficits, address ROM deficits, address strength deficits, analyze and address soft tissue restrictions, analyze and cue movement patterns, analyze and modify body mechanics/ergonomics and assess and modify postural abnormalities to attain remaining goals. [x]  See Plan of Care  []  See progress note/recertification  []  See Discharge Summary         Progress towards goals / Updated goals:  Term Goals: To be accomplished in 4 weeks:  Patient will demonstrate proper dietary/fluid habits and urge suppression strategies that promote bladder and bowel health to aid in management of urinary urgency and incontinence. Status at eval: Patient consuming irriants and unaware urge suppression strategies.    1/12/2021 NO UI; GOAL MET      Patient will tolerate insertion of medium dilator, static, for 15 minutes with pain less than 4/10  Status at eval: all insertion elicits 6/48 pain, unable to have intercourse   1/12/2021 Now addressing pelvic pain goals; patient tolerated pelvic floor manual, one dimensional stretch and trigger point release today 3/10; has dilator for self release, not yet used; PROGRESSING TOWARDS GOAL  1/19/2021 Patient has used XS dilator externally only, reviewed self-management strategies today and progressing towards internal release , provided S+ dilator today for management over the next two weeks until pt return to PFPT; [address]     Patient will demonstrate independence with HEP and self-management that promotes pelvic floor relaxation and awareness.    Status at Eval: Patient performing no activity for self-care and/or stress/pelvic pain management.   1/19/21 Patient is compliant with updated HEP and management tools, GOAL MOSTLY MET, progressing towards self-management tools for pelvic pain     Pt will get up during the night 0-1x to urinate signifying age-appropriate nocturia and improved sleeping routine.   Eval: 2-3x/ night, always with UUI sometimes having to change pad  1/12/2021 Now waking 0-1x/night; GOAL MET     Patient will demonstrate improvement of current complaints evidenced by a 50%   improvement in FOTO, PFDI score  Status at Eval: PFDI 21  1/12/2021  PFDI 14; 17% limitation, PROGRESSING TOWARDS PELVIC PAIN GOALS    PLAN  []  Upgrade activities as tolerated     [x]  Continue plan of care  [x]  Update interventions per flow sheet       []  Discharge due to:_  []  Other:_  F/u dilator goals; progress towards internal manual and visceral work    Bull Organ, PT 1/19/2021  10:21 AM    Future Appointments   Date Time Provider Howard Larsen   2/11/2021  3:00 PM Tierra Holcomb, PT [facility]   2/16/2021  2:15 PM Tierra Holcomb, PT [facility]   2/23/2021  2:15 PM Tierra Holcomb, PT [facility]   7/13/2021 10:00 AM Pau Pyle MD [facility]

## 2021-01-19 NOTE — PROGRESS NOTES
[facility]  [address], [address] [facility] [place]  Ph [phone]  Fx [phone]    Continued Plan of Care/ Re-certification for Physical Therapy Services    Patient name: Anastasiia Lr Start of Care: 10/27/2020   Referral source: Debra Shah NP : [id number]   Medical/Treatment Diagnosis: Muscle wasting and atrophy, not elsewhere classified, other site [M62.58] Onset Date:20 years ago     Prior Hospitalization: see medical history Provider#: [id number]   Medications: Verified on Patient Summary List    Comorbidities: lichen sclerosis, 2 vaginal deliveries   Prior Level of Function: Prior to menopause approximately 20 years ago no vaginal or urinary pain or issues    Visits from Start of Care: 15    Missed Visits: 0    The Plan of Care and following information is based on the patient's current status:  Term Goals: To be accomplished in 4 weeks:  Patient will demonstrate proper dietary/fluid habits and urge suppression strategies that promote bladder and bowel health to aid in management of urinary urgency and incontinence. Status at eval: Patient consuming irriants and unaware urge suppression strategies.    2021 NO UI; GOAL MET      Patient will tolerate insertion of medium dilator, static, for 15 minutes with pain less than 4/10  Status at eval: all insertion elicits 5/41 pain, unable to have intercourse   2021 Now addressing pelvic pain goals; patient tolerated pelvic floor manual, one dimensional stretch and trigger point release today 3/10; has dilator for self release, not yet used; PROGRESSING TOWARDS GOAL  2021 Patient has used XS dilator externally only, reviewed self-management strategies today and progressing towards internal release , provided S+ dilator today for management over the next two weeks until pt return to PFPT; [address] [facility]     Patient will demonstrate independence with HEP and self-management that promotes pelvic floor relaxation and awareness. Status at Eval: Patient performing no activity for self-care and/or stress/pelvic pain management.   1/19/21 Patient is compliant with updated HEP and management tools, GOAL MOSTLY MET, progressing towards self-management tools for pelvic pain     Pt will get up during the night 0-1x to urinate signifying age-appropriate nocturia and improved sleeping routine. Eval: 2-3x/ night, always with UUI sometimes having to change pad  1/12/2021 Now waking 0-1x/night; GOAL MET     Patient will demonstrate improvement of current complaints evidenced by a 50%   improvement in FOTO, PFDI score  Status at Eval: PFDI 21  1/12/2021  PFDI 14; 17% limitation, PROGRESSING TOWARDS PELVIC PAIN GOALS    Key functional changes: Established Goals:                                    Excellent        Good           Limited             None  [x]? Increase Pelvic MM strength            []? [x]? []?                    []?  [x]? Decrease Pelvic MM hypertonus     []? [x]? []?                    []?  [x]? Decrease Incontinence Episodes    [x]? []?                    []?                    []?          [x]? Improve Voiding Habits                    [x]? []?                    []?                    []?   [x]? Decreased Urgency                           [x]? []?                    []?                    []?  [x]? Decrease Pelvic Pain                        []?              [x]? []?                    []?     Key Functional Changes: Patient reports overall 75% improvement in vaginal pain complaints; only having brief infrequent episodes, no longer interfering with sleep or daytime activities. Patient no longer having urinary issues; waking 0-1 times/night.    Progressing now towards pelvic pain/intercourse/insertional goals    Problems/ barriers to goal attainment: fear avoidance, past trauma    Problem List: pain affecting function, decrease ROM, decrease strength, decrease ADL/ functional abilitiies, decrease activity tolerance, decrease flexibility/ joint mobility and decrease transfer abilities    Treatment Plan: Therapeutic exercise, Therapeutic activities, Neuromuscular re-education, Physical agent/modality, Manual therapy, Patient education, Self Care training and Functional mobility training     Patient Goal (s) has been updated and includes: pelvic pain goals, see above     Goals for this certification period to be accomplished in 8 weeks:  Patient will demonstrate improvement of current complaints evidenced by a 50%   improvement in FOTO, PFDI score  Status at Eval: PFDI 21  1/12/2021  PFDI 14; 17% limitation, PROGRESSING TOWARDS PELVIC PAIN GOALS    Patient will tolerate insertion of medium dilator, static, for 15 minutes with pain less than 4/10  Status at [facility]: all insertion elicits 8/17 pain, unable to have intercourse   1/12/2021 Now addressing pelvic pain goals; patient tolerated pelvic floor manual, one dimensional stretch and trigger point release today 3/10; has dilator for self release, not yet used; PROGRESSING TOWARDS GOAL  1/19/2021 Patient has used XS dilator externally only, reviewed self-management strategies today and progressing towards internal release , provided S+ dilator today for management over the next two weeks until pt return to PFPT; [address] [facility]     Patient will demonstrate independence with HEP and self-management that promotes pelvic floor relaxation and awareness.    Status at [facility]: Patient performing no activity for self-care and/or stress/pelvic pain management.   1/19/21 Patient is compliant with updated HEP and management tools, GOAL MOSTLY MET, progressing towards self-management tools for pelvic pain     Frequency / Duration: Patient to be seen 1 times per week for 8 weeks:    Assessment / Recommendations:Patient continues to c/o pelvic pain and fear of intercourse and annual exams 2/2 pain with penetration and fear avoidance. Patient deomonstrates decreased strength and coordination of pelvic floor muscles and soft tissue pain and restriction. Patient will continue to benefit from skilled PT services to modify and progress therapeutic interventions, address functional mobility deficits, address ROM deficits, address strength deficits, analyze and address soft tissue restrictions, analyze and cue movement patterns, analyze and modify body mechanics/ergonomics and assess and modify postural abnormalities to attain remaining goals. Certification Period: 90 days  Reporting Period:  1/25/2021 - 4/23/2021    Joaquina Zhang, PT 1/19/2021 11:11 AM    ________________________________________________________________________  I certify that the above Therapy Services are being furnished while the patient is under my care. I agree with the treatment plan and certify that this therapy is necessary. [] I have read the above and request that my patient continue as recommended.   [] I have read the above report and request that my patient continue therapy with the following changes/special instructions: _______________________________________  [] I have read the above report and request that my patient be discharged from therapy    Physician's Signature:____________________ Date:_________ TIME:________    [employer], Date and Time must be completed for valid certification **  Please sign and return to [facility]  [address], [address] [facility] [place]    Ph [phone]  Fx [phone]

## 2021-01-21 ENCOUNTER — APPOINTMENT (OUTPATIENT)
Dept: PHYSICAL THERAPY | Age: 76
End: 2021-01-21
Payer: MEDICARE

## 2021-02-11 ENCOUNTER — HOSPITAL ENCOUNTER (OUTPATIENT)
Dept: PHYSICAL THERAPY | Age: 76
Discharge: HOME | End: 2021-02-11
Payer: MEDICARE

## 2021-02-11 PROCEDURE — 97110 THERAPEUTIC EXERCISES: CPT

## 2021-02-11 PROCEDURE — 97535 SELF CARE MNGMENT TRAINING: CPT

## 2021-02-11 NOTE — PROGRESS NOTES
PT DAILY TREATMENT NOTE    Patient Name: Kaylee Guajardo  Date:2021  : 1945  [x]  Patient  Verified  Payor: Haley Wilkins / Plan: [place] MEDICARE PART A & B / Product Type: Medicare /    In time:1500  Out time:1545  Total Treatment Time (min): 45  Total Timed Codes (min): 40  1:1 Treatment Time ( [address] only): 39   Visit #: 16 of 44    Treatment Area: Muscle wasting and atrophy, not elsewhere classified, other site [M62.58]    SUBJECTIVE  Pain Level (0-10 scale): 4 LBP  Any medication changes, allergies to medications, adverse drug reactions, diagnosis change, or new procedure performed?: [x] No    [] Yes (see summary sheet for update)  Subjective functional status/changes:   [] No changes reported  Patient reports: compliance with current HEP. Patient states she has been practicing with dilator, less pain than expected; done about 4-5 times, with pain about 6/10. Patient states pain has been much improved overall; states she ate ice cream and had pain following day. OBJECTIVE    30 min Therapeutic Exercise:  [x] See flow sheet :   Rationale: increase ROM and increase strength to improve the patients ability to improve functional mobility and decrease LBP; provide morning management for OA and mobility walking to toilet      10 min Self Care: Thorough review and handout provided on the following: progress in terms of self-management tools and expectations for independent management of current condition; plan for f/u in 3-4 weeks and DC as appropriate  Reviewed dilator goals and HEP expectations; reviewed inflammatory foods    Rationale:  Increase awareness and understanding of current condition to improve patients ability to independently and effectively attain goals and progress towards long term management of current condition.                With   [x] TE   [] TA   [] neuro   [] other: Patient Education: [x] Review HEP    [] Progressed/Changed HEP based on:   [] positioning   [] body mechanics   [] transfers [] heat/ice application    [] other:         Pain Level (0-10 scale) post treatment: 2    ASSESSMENT/Changes in Function: Patient tolerated today's session well with no c/o pain. Patient demonstrated understanding of today's instruction, education, and recommendations. Patient is progressing appropriately towards goals. Patient is progressing well towards HEP independence and long term self-management; plan to f/u in 3-4 weeks and DC then as appropriate    Patient will continue to benefit from skilled PT services to modify and progress therapeutic interventions, address functional mobility deficits, address ROM deficits, address strength deficits, analyze and address soft tissue restrictions, analyze and cue movement patterns, analyze and modify body mechanics/ergonomics and assess and modify postural abnormalities to attain remaining goals. [x]  See Plan of Care  []  See progress note/recertification  []  See Discharge Summary         Progress towards goals / Updated goals:  Term Goals: To be accomplished in 4 weeks:  Patient will demonstrate proper dietary/fluid habits and urge suppression strategies that promote bladder and bowel health to aid in management of urinary urgency and incontinence. Status at eval: Patient consuming irriants and unaware urge suppression strategies.    1/12/2021 NO UI; GOAL MET      Patient will tolerate insertion of medium dilator, static, for 15 minutes with pain less than 4/10  Status at eval: all insertion elicits 2/31 pain, unable to have intercourse   1/12/2021 Now addressing pelvic pain goals; patient tolerated pelvic floor manual, one dimensional stretch and trigger point release today 3/10; has dilator for self release, not yet used; PROGRESSING TOWARDS GOAL  2/11/2021 patient has used XS dilator internally 4-5 times in the last 2 weeks, pain as much as 6/10.  No vaginal pain otherwise; PROGRESSING TOWARDS GOAL INDEPENDENTLY      Patient will demonstrate independence with HEP and self-management that promotes pelvic floor relaxation and awareness. Status at Eval: Patient performing no activity for self-care and/or stress/pelvic pain management.   1/19/21 Patient is compliant with updated HEP and management tools, GOAL MOSTLY MET     Pt will get up during the night 0-1x to urinate signifying age-appropriate nocturia and improved sleeping routine.   Eval: 2-3x/ night, always with UUI sometimes having to change pad  1/12/2021 Now waking 0-1x/night; GOAL MET     Patient will demonstrate improvement of current complaints evidenced by a 50%   improvement in FOTO, PFDI score  Status at Eval: PFDI 21  1/12/2021  PFDI 14; 17% limitation, PROGRESSING TOWARDS PELVIC PAIN GOALS    PLAN  [x]  Upgrade activities as tolerated, patient will return in 3-4 weeks for DC as appropriate       [x]  Continue plan of care  [x]  Update interventions per flow sheet       []  Discharge due to:_  []  Other:_  DC next session?; progress core strengthening    Kenzie Nolan, PT 2/11/2021  3:10 PM    Future Appointments   Date Time Provider Howard Larsen   2/16/2021  2:15 PM Anahi Holcomb, PT [facility]   2/23/2021  2:15 PM Anahi Holcomb, PT [facility]   7/13/2021 10:00 AM MD MAGAN Joy AMB

## 2021-02-16 ENCOUNTER — HOSPITAL ENCOUNTER (OUTPATIENT)
Dept: PHYSICAL THERAPY | Age: 76
End: 2021-02-16
Payer: MEDICARE

## 2021-02-23 ENCOUNTER — APPOINTMENT (OUTPATIENT)
Dept: PHYSICAL THERAPY | Age: 76
End: 2021-02-23
Payer: MEDICARE

## 2021-03-03 ENCOUNTER — HOSPITAL ENCOUNTER (OUTPATIENT)
Dept: PHYSICAL THERAPY | Age: 76
Discharge: HOME | End: 2021-03-03
Payer: MEDICARE

## 2021-03-03 PROCEDURE — 97535 SELF CARE MNGMENT TRAINING: CPT

## 2021-03-03 PROCEDURE — 97110 THERAPEUTIC EXERCISES: CPT

## 2021-03-03 NOTE — PROGRESS NOTES
PT DAILY TREATMENT NOTE    Patient Name: Lillie Light  Date:3/3/2021  : 1945  [x]  Patient  Verified  Payor: Kaylee Hagen / Plan: [place] MEDICARE PART A & B / Product Type: Medicare /    In time:1035  Out time:1115  Total Treatment Time (min): 40  Total Timed Codes (min): 40  1:1 Treatment Time ([facility] only): 40   Visit #: 81 of 44    Treatment Area: Muscle wasting and atrophy, not elsewhere classified, other site [M62.58]    SUBJECTIVE  Pain Level (0-10 scale): 0  Any medication changes, allergies to medications, adverse drug reactions, diagnosis change, or new procedure performed?: [x] No    [] Yes (see summary sheet for update)  Subjective functional status/changes:   [] No changes reported  Patient reports: compliance with current HEP. Patient states she has been feeling good overall, no vaginal pain of note. States she has been compliant with HEP and feels prepared for DC. OBJECTIVE    25 min Therapeutic Exercise:  [x] See flow sheet :   Rationale: increase ROM, increase strength and improve coordination to improve the patients ability to provide pain management tools for long term management of current condition      15 min Self Care: Thorough review and handout provided on the following: goals and progress to date; ed provided throughout  Reviewed intimacy and return to intercourse strategies and goals   Rationale:  Increase awareness and understanding of current condition to improve patients ability to independently and effectively attain goals and progress towards long term management of current condition. With   [] TE   [] TA   [] neuro   [x] other: Patient Education: [x] Review HEP    [] Progressed/Changed HEP based on:   [] positioning   [] body mechanics   [] transfers   [] heat/ice application    [] other:         Pain Level (0-10 scale) post treatment: 0    ASSESSMENT/Changes in Function: Patient tolerated today's session well with no c/o pain.  Patient demonstrated understanding of today's instruction, education, and recommendations for long term management of current condition. Patient has achieved goals and is ready for DC and independence with updated HEP and progressions. []  See Plan of Care  []  See progress note/recertification  [x]  See Discharge Summary         Progress towards goals / Updated goals:  Term Goals: To be accomplished in 4 weeks:  Patient will demonstrate proper dietary/fluid habits and urge suppression strategies that promote bladder and bowel health to aid in management of urinary urgency and incontinence. Status at eval: Patient consuming irriants and unaware urge suppression strategies.    1/12/2021 NO UI; GOAL MET      Patient will tolerate insertion of medium dilator, static, for 15 minutes with pain less than 4/10  Status at eval: all insertion elicits 1/57 pain, unable to have intercourse   3/3/2021 Overall vaginal pain is occurring \"rarely\" so unable to give pain rating and manages with Mr Renetta Holt ice pack and stretches, XS dilator is about 6/10 and using occasionally, Has not attempted intercourse but \"is thinking about it\" and understands strategies for introducing intimacy back. PROGRESSING TOWARDS GOAL INDEPENDENTLY     Patient will demonstrate independence with HEP and self-management that promotes pelvic floor relaxation and awareness. Status at Eval: Patient performing no activity for self-care and/or stress/pelvic pain management.   3/3/2021 Patient is compliant with updated HEP and management tools using coconut oil and hydration cubes for management of current condition and feels equipped with all the tools to progress towards intimacy and long term management goals, GOAL MET     Pt will get up during the night 0-1x to urinate signifying age-appropriate nocturia and improved sleeping routine.   Eval: 2-3x/ night, always with UUI sometimes having to change pad  1/12/2021 Now waking 0-1x/night; GOAL MET     Patient will demonstrate improvement of current complaints evidenced by a 50%   improvement in FOTO, PFDI score  Status at Eval: PFDI 21  3/3/2021  PFDI 14; 17% limitation, PROGRESSING TOWARDS functional goal independently; unable to report sexual intimacy goal 2/2 not initiated with partner at this time but plans to do so in near future    PLAN  []  Upgrade activities as tolerated     []  Continue plan of care  []  Update interventions per flow sheet       [x]  Discharge due to:_Patient has met all goals and is independent and confident with exercise progressions and management tools for long term management of current condition.  []  Other:_      Rupert Allen, PT 3/3/2021  10:41 AM    Future Appointments   Date Time Provider Howard Larsen   3/10/2021 10:30 AM Catalina Holcomb 149, PT [facility]   7/13/2021 10:00 AM Cecy Berman MD [facility] AMB

## 2021-03-03 NOTE — PROGRESS NOTES
[facility]  [address], [address]  Ph [phone]  Fx [phone]    Physical Therapy Discharge Summary    Patient name: Alonso Hester Start of Care: 10/20/2020   Referral source: Ted Cornelius NP : 1945   Medical/Treatment Diagnosis: Muscle wasting and atrophy, not elsewhere classified, other site [M62.58] Onset Date:20 years ago     Prior Hospitalization: see medical history Provider#: [id number]   Medications: Verified on Patient Summary List    omorbidities: lichen sclerosis, 2 vaginal deliveries   Prior Level of Function: Prior to menopause approximately 20 years ago no vaginal or urinary pain or issues    Visits from Start of Care: 17    Missed Visits: 0    Reporting Period : 10/27/2020 to 3/3/2021      Summary of Care:    Patient demonstrated understanding of today's instruction, education, and recommendations for long term management of current condition. Patient has achieved goals or is progressing towards goals independently and is ready for DC and independence with updated HEP and progressions.            Progress towards goals / Updated goals:  Term Goals: To be accomplished in 4 weeks:  Patient will demonstrate proper dietary/fluid habits and urge suppression strategies that promote bladder and bowel health to aid in management of urinary urgency and incontinence. Status at eval: Patient consuming irriants and unaware urge suppression strategies.    2021 NO UI; GOAL MET      Patient will tolerate insertion of medium dilator, static, for 15 minutes with pain less than 4/10  Status at eval: all insertion elicits 4/61 pain, unable to have intercourse   3/3/2021 Overall vaginal pain is occurring \"rarely\" so unable to give pain rating and manages with Mr Emerald Fontenot ice pack and stretches, XS dilator is about 6/10 and using occasionally, Has not attempted intercourse but \"is thinking about it\" and understands strategies for introducing intimacy back. PROGRESSING TOWARDS GOAL INDEPENDENTLY     Patient will demonstrate independence with HEP and self-management that promotes pelvic floor relaxation and awareness. Status at Eval: Patient performing no activity for self-care and/or stress/pelvic pain management.   3/3/2021 Patient is compliant with updated HEP and management tools using coconut oil and hydration cubes for management of current condition and feels equipped with all the tools to progress towards intimacy and long term management goals, GOAL MET     Pt will get up during the night 0-1x to urinate signifying age-appropriate nocturia and improved sleeping routine.   Eval: 2-3x/ night, always with UUI sometimes having to change pad  1/12/2021 Now waking 0-1x/night; GOAL MET     Patient will demonstrate improvement of current complaints evidenced by a 50%   improvement in FOTO, PFDI score  Status at Eval: PFDI 21  3/3/2021  PFDI 14; 17% limitation, PROGRESSING TOWARDS functional goal independently; unable to report sexual intimacy goal 2/2 not initiated with partner at this time but plans to do so in near future and feels comfortable with management tools and recommendations at this time    ASSESSMENT/RECOMMENDATIONS:  [x]Discontinue therapy: [x]Patient has reached or is progressing toward set goals      []Patient is non-compliant or has abdicated      []Due to lack of appreciable progress towards set goals    Kendra Bumpers, PT 3/3/2021 12:18 PM

## 2021-03-10 ENCOUNTER — APPOINTMENT (OUTPATIENT)
Dept: PHYSICAL THERAPY | Age: 76
End: 2021-03-10
Payer: MEDICARE

## 2021-11-16 ENCOUNTER — OFFICE VISIT (OUTPATIENT)
Dept: ONCOLOGY | Age: 76
End: 2021-11-16
Payer: MEDICARE

## 2021-11-16 VITALS
TEMPERATURE: 97.4 F | DIASTOLIC BLOOD PRESSURE: 73 MMHG | BODY MASS INDEX: 37.93 KG/M2 | SYSTOLIC BLOOD PRESSURE: 158 MMHG | WEIGHT: 236 LBS | OXYGEN SATURATION: 100 % | HEIGHT: 66 IN | HEART RATE: 61 BPM

## 2021-11-16 DIAGNOSIS — R10.2 VULVOVAGINAL PAIN: Primary | ICD-10-CM

## 2021-11-16 PROCEDURE — 99213 OFFICE O/P EST LOW 20 MIN: CPT | Performed by: OBSTETRICS & GYNECOLOGY

## 2021-11-16 PROCEDURE — G8536 NO DOC ELDER MAL SCRN: HCPCS | Performed by: OBSTETRICS & GYNECOLOGY

## 2021-11-16 PROCEDURE — 1090F PRES/ABSN URINE INCON ASSESS: CPT | Performed by: OBSTETRICS & GYNECOLOGY

## 2021-11-16 PROCEDURE — 3017F COLORECTAL CA SCREEN DOC REV: CPT | Performed by: OBSTETRICS & GYNECOLOGY

## 2021-11-16 PROCEDURE — G8510 SCR DEP NEG, NO PLAN REQD: HCPCS | Performed by: OBSTETRICS & GYNECOLOGY

## 2021-11-16 PROCEDURE — G0463 HOSPITAL OUTPT CLINIC VISIT: HCPCS | Performed by: OBSTETRICS & GYNECOLOGY

## 2021-11-16 PROCEDURE — 1101F PT FALLS ASSESS-DOCD LE1/YR: CPT | Performed by: OBSTETRICS & GYNECOLOGY

## 2021-11-16 PROCEDURE — G8417 CALC BMI ABV UP PARAM F/U: HCPCS | Performed by: OBSTETRICS & GYNECOLOGY

## 2021-11-16 PROCEDURE — G8427 DOCREV CUR MEDS BY ELIG CLIN: HCPCS | Performed by: OBSTETRICS & GYNECOLOGY

## 2021-11-16 PROCEDURE — G8400 PT W/DXA NO RESULTS DOC: HCPCS | Performed by: OBSTETRICS & GYNECOLOGY

## 2021-11-16 RX ORDER — LOSARTAN POTASSIUM 50 MG/1
50 TABLET ORAL DAILY
COMMUNITY
Start: 2021-06-30

## 2021-11-16 RX ORDER — CLOBETASOL PROPIONATE 0.5 MG/G
CREAM TOPICAL
Qty: 30 G | Refills: 3 | Status: SHIPPED | OUTPATIENT
Start: 2021-11-16

## 2021-11-16 NOTE — LETTER
11/16/2021    Patient: Vick Rosales   YOB: 1945   Date of Visit: 11/16/2021     Neeta Grant MD  [facility] [place] [address] [address] [place] [address]  Via Fax: [phone]    Dear Neeta Grant MD,      [facility]. My notes for this consultation are attached. If you have questions, please do not hesitate to call me. I look forward to following your patient along with you.       Sincerely,    Mary Jo Contreras MD

## 2021-11-16 NOTE — PROGRESS NOTES
[facility]  [address] [place] [address], [address], [address] [facility]  [address] [facility], [address] [place] [address]  [place], [address]   [phone]  Merle Lemosjames COOK          Patient ID:  Name:  Sabrina Ash  MRN:  [id number]  :  1945/75 y.o. Date:  2021      HISTORY OF PRESENT ILLNESS:  Sabrina Ash is a 70 y.o.  postmenopausal female referred by Dr. Arlet Jacinto for pelvic mass. Has h/o back pain s/p epidural steroid injection She was seen for workup of groin pain, s/p TVUS which demonstrated a nonspecific soft tissue mass 1.7cm in the lower uterine segment near the cervix, MRI previously reviewed and was negative. Patient's states her  has been sick and therefore she has not used any of the Premarin or clobetasol cream. She is also not kept up with her pelvic floor physical therapy. Having some burning which she is unclear whether its vaginally or vulvar. Labs:  none      Imaging:  MRI 4/3/2020  FINDINGS:     UTERUS:  Orientation: Retroverted  Size: 8.0 x 3.0 x 5.2 cm  Masses: 2 small calcified fibroids are present which correspond with the  calcifications seen on ultrasound 3/9/2020. Endometrium: Normal.   Junctional zone: Normal.  Cervix: Normal.     ADNEXA:  Unremarkable.     OTHER:  Colonic diverticulosis is present.      ========     IMPRESSION  IMPRESSION:     Cervical mass is not noted. Focal hyperechoic myometrial area seen on ultrasound  3/9/2023 corresponds with redundant myometrium related to the retroverted  uterus. 3/10/2020  TVUS  Scanned in media  IMPRESSION:  Nonspecific sort tissue 1.7cm mass present in the lower uterine segment/cervix which is abutting the endometrium. This could represent a low uterine fibroid, or a cervical mass. Tissue sampling may be indicated to r/o malignancy. Right ovary demonstrates normal arterial and venous flow. Left ovary obscured by bowel gas.        ROS:   As above      Patient Active Problem List    Diagnosis Date Noted    Severe obesity ([address] 2020     Past Medical History:   Diagnosis Date    Hypercholesterolemia     Hypertension     Hypertension       Past Surgical History:   Procedure Laterality Date    HX COLONOSCOPY      IN BREAST SURGERY PROCEDURE UNLISTED Left     lump removal      OB History        2    Para        Term                AB        Living   2       SAB        IAB        Ectopic        Molar        Multiple        Live Births                  Social History     Tobacco Use    Smoking status: Former Smoker    Smokeless tobacco: Never Used   Substance Use Topics    Alcohol use: Yes     Comment: 2 per month/social      Family History   Problem Relation Age of Onset    Cancer Maternal Grandmother         lung      Current Outpatient Medications   Medication Sig    losartan (COZAAR) 50 mg tablet Take 50 mg by mouth daily.  clobetasoL (TEMOVATE) 0.05 % topical cream Apply  to affected area nightly.  conjugated estrogens (PREMARIN) 0.625 mg/gram vaginal cream Insert 0.5 g into vagina daily. Insert 0.5g vaginally for 21 days at bedtime. Then insert 1-3 nights weekly for maintenance therapy    atorvastatin (LIPITOR) 10 mg tablet TAKE 1 TABLET EVERY NIGHT AT BEDTIME    multivitamin (ONE A DAY) tablet Take 1 Tab by mouth daily.  ergocalciferol (ERGOCALCIFEROL) 1,250 mcg (50,000 unit) capsule Take 50,000 Units by mouth every seven (7) days.  lidocaine 5 % topical cream Apply  to affected area daily as needed for Pain. (Patient not taking: Reported on 2021)     No current facility-administered medications for this visit.      Allergies   Allergen Reactions    Latex Rash    Penicillins Hives          OBJECTIVE:    Physical Exam  VITAL SIGNS: Visit Vitals  BP (!) 158/73 (BP 1 Location: Left upper arm, BP Patient Position: Sitting)   Pulse 61   Temp 97.4 °F (36.3 °C) (Oral)   Ht 5' 6\" (1.676 m)   Wt 107 kg (236 lb) SpO2 100%   BMI 38.09 kg/m²      GENERAL BREANNA: in no apparent distress and well developed and well nourished   MUSCULOSKEL: no joint tenderness, deformity or swelling   INTEGUMENT:  warm and dry, no rashes or lesions   ABDOMEN . soft, NT, ND, No masses appreciated   EXTREMITIES: extremities normal, atraumatic, no cyanosis or edema   PELVIC: External genitalia: obliteration of normal anatomy with erythema c/w lichen sclerosis  Vaginal: atrophic mucosa, inflammed, no discharge, lesions  Cervix: normal appearance  Adnexa: normal bimanual exam  Uterus: normal single, nontender   RECTAL: deferred   ROVERTO SURVEY: Cervical, supraclavicular, axillary and inguinal nodes normal.   NEURO: Grossly normal          IMPRESSION/PLAN:  1. Lichen sclerosis   -no need for biopsy at this time   -pt info regarding vulvar hygiene given to patient   -clobetasol prn   -ok to f/u in 6 months or if symptoms worsen    2.  Vulvovaginal pain, intermittent   -Cont premarin     3. ? Cervical mass   -previously reviewed MRI with no mass just retroverted uterus         The total time spent was 25 minutes regarding this patients diagnosis of lichen sclerosis, cervical mass and >50% of this time was spent counseling and coordinating care    [address] [place] [address]  Gynecologic Oncology  11/16/202112:21 PM

## 2021-11-16 NOTE — PROGRESS NOTES
Balbina Shepherd is a 70 y.o. female presents in office for surveillance Lichen. Chief Complaint   Patient presents with    Follow-up     lichen sclerosis      Do you have any unusual vaginal bleeding, discharge or irritation? Pt has burning sensation mostly at night. Pt has not been using Clobetasol  Do you have any changes in your bowel movements? no  Have you been experiencing nausea or vomiting? no  Have you been experiencing any continuous or worsening abdominal pain? no  Any urinary burning? no    Visit Vitals  BP (!) 158/73 (BP 1 Location: Left upper arm, BP Patient Position: Sitting)   Pulse 61   Temp 97.4 °F (36.3 °C) (Oral)   Ht 5' 6\" (1.676 m)   Wt 236 lb (107 kg)   SpO2 100%   BMI 38.09 kg/m²         1. Have you been to the ER, urgent care clinic since your last visit? Hospitalized since your last visit? No    2. Have you seen or consulted any other health care providers outside of the [address] [place] [address] since your last visit? Include any pap smears or colon screening. No    3 most recent PHQ Screens 11/16/2021   Little interest or pleasure in doing things Not at all   Feeling down, depressed, irritable, or hopeless Not at all   Total Score PHQ 2 0       No flowsheet data found. Fall Risk Assessment, last 12 mths 11/16/2021   Able to walk? Yes   Fall in past 12 months? 0   Do you feel unsteady?  0   Are you worried about falling 0       Learning Assessment 3/24/2020   PRIMARY LEARNER Patient   BARRIERS PRIMARY LEARNER NONE   CO-LEARNER CAREGIVER No   PRIMARY LANGUAGE ENGLISH   LEARNER PREFERENCE PRIMARY DEMONSTRATION   ANSWERED BY Patient   RELATIONSHIP SELF

## 2022-03-08 ENCOUNTER — OFFICE VISIT (OUTPATIENT)
Dept: ONCOLOGY | Age: 77
End: 2022-03-08
Payer: MEDICARE

## 2022-03-08 VITALS
BODY MASS INDEX: 39.05 KG/M2 | TEMPERATURE: 97.8 F | DIASTOLIC BLOOD PRESSURE: 47 MMHG | OXYGEN SATURATION: 99 % | HEART RATE: 73 BPM | SYSTOLIC BLOOD PRESSURE: 115 MMHG | HEIGHT: 66 IN | WEIGHT: 243 LBS

## 2022-03-08 DIAGNOSIS — L90.0 LICHEN SCLEROSUS ET ATROPHICUS: Primary | ICD-10-CM

## 2022-03-08 DIAGNOSIS — R10.2 VULVAR PAIN: ICD-10-CM

## 2022-03-08 PROCEDURE — G8400 PT W/DXA NO RESULTS DOC: HCPCS | Performed by: OBSTETRICS & GYNECOLOGY

## 2022-03-08 PROCEDURE — G8536 NO DOC ELDER MAL SCRN: HCPCS | Performed by: OBSTETRICS & GYNECOLOGY

## 2022-03-08 PROCEDURE — G8427 DOCREV CUR MEDS BY ELIG CLIN: HCPCS | Performed by: OBSTETRICS & GYNECOLOGY

## 2022-03-08 PROCEDURE — G8510 SCR DEP NEG, NO PLAN REQD: HCPCS | Performed by: OBSTETRICS & GYNECOLOGY

## 2022-03-08 PROCEDURE — 1090F PRES/ABSN URINE INCON ASSESS: CPT | Performed by: OBSTETRICS & GYNECOLOGY

## 2022-03-08 PROCEDURE — 99213 OFFICE O/P EST LOW 20 MIN: CPT | Performed by: OBSTETRICS & GYNECOLOGY

## 2022-03-08 PROCEDURE — 1101F PT FALLS ASSESS-DOCD LE1/YR: CPT | Performed by: OBSTETRICS & GYNECOLOGY

## 2022-03-08 PROCEDURE — G0463 HOSPITAL OUTPT CLINIC VISIT: HCPCS | Performed by: OBSTETRICS & GYNECOLOGY

## 2022-03-08 PROCEDURE — G8417 CALC BMI ABV UP PARAM F/U: HCPCS | Performed by: OBSTETRICS & GYNECOLOGY

## 2022-03-08 PROCEDURE — 3017F COLORECTAL CA SCREEN DOC REV: CPT | Performed by: OBSTETRICS & GYNECOLOGY

## 2022-03-08 NOTE — PROGRESS NOTES
Bryson Valentine is a 78 y.o. female presents in office for a lump on RT side of vagina found while showering. Pt reports tenderness but no bleeding. Chief Complaint   Patient presents with    Follow-up      Pt reports frequent dizziness, chest pressure for the past couple of days. Do you have any unusual vaginal bleeding, discharge or irritation? no  Do you have any changes in your bowel movements? no  Have you been experiencing nausea or vomiting? no  Have you been experiencing any continuous or worsening abdominal pain? no  Any urinary burning? no    Visit Vitals  BP (!) 115/47 (BP 1 Location: Left upper arm, BP Patient Position: Sitting)   Pulse 73   Temp 97.8 °F (36.6 °C) (Oral)   Ht 5' 6\" (1.676 m)   Wt 243 lb (110.2 kg)   SpO2 99%   BMI 39.22 kg/m²         1. Have you been to the ER, urgent care clinic since your last visit? Hospitalized since your last visit? No    2. Have you seen or consulted any other health care providers outside of the [address] [place] [address] since your last visit? Include any pap smears or colon screening. No    3 most recent PHQ Screens 3/8/2022   Little interest or pleasure in doing things Not at all   Feeling down, depressed, irritable, or hopeless Not at all   Total Score PHQ 2 0       No flowsheet data found. Fall Risk Assessment, last 12 mths 3/8/2022   Able to walk?  Yes   Fall in past 12 months? 0   Do you feel unsteady? -   Are you worried about falling -       Learning Assessment 3/24/2020   PRIMARY LEARNER Patient   BARRIERS PRIMARY LEARNER NONE   CO-LEARNER CAREGIVER No   PRIMARY LANGUAGE ENGLISH   LEARNER PREFERENCE PRIMARY DEMONSTRATION   ANSWERED BY Patient   RELATIONSHIP SELF

## 2022-03-08 NOTE — LETTER
3/8/2022    Patient: Marcille Cooks   YOB: 1945   Date of Visit: 3/8/2022     Birdie Clemente MD  [facility] [phone] [place] [address]  Via Fax: [phone]    Dear Birdie Clemente MD,      Thank you for referring Ms. Marcille Cooks to [facility] for evaluation. My notes for this consultation are attached. If you have questions, please do not hesitate to call me. I look forward to following your patient along with you.       Sincerely,    Nghia Webb MD

## 2022-03-08 NOTE — PROGRESS NOTES
[facility]  [address] [place] [address], [address], [address] [facility]  [address] [facility] [place], [address] [place] [address]  [place] [phone]  Rachel Mai DO          Patient ID:  Name:  Rachel Swain  MRN:  [id number]  :  1945/76 y.o. Date:  3/8/2022      HISTORY OF PRESENT ILLNESS:  Rachel Swain is a 78 y.o.  postmenopausal female referred by Dr. Meryle Stade for pelvic mass. Has h/o back pain s/p epidural steroid injection She was seen for workup of groin pain, s/p TVUS which demonstrated a nonspecific soft tissue mass 1.7cm in the lower uterine segment near the cervix, MRI previously reviewed and was negative. Rebekah Fritz Presents today with c/o painful lump in the groin area. States she noticed lump on right side of lower perineum after shower. Denies bleeding  Labs:  none      Imaging:  MRI 4/3/2020  FINDINGS:     UTERUS:  Orientation: Retroverted  Size: 8.0 x 3.0 x 5.2 cm  Masses: 2 small calcified fibroids are present which correspond with the  calcifications seen on ultrasound 3/9/2020. Endometrium: Normal.   Junctional zone: Normal.  Cervix: Normal.     ADNEXA:  Unremarkable.     OTHER:  Colonic diverticulosis is present.      ========     IMPRESSION  IMPRESSION:     Cervical mass is not noted. Focal hyperechoic myometrial area seen on ultrasound  3/9/2023 corresponds with redundant myometrium related to the retroverted  uterus. 3/10/2020  TVUS  Scanned in media  IMPRESSION:  Nonspecific sort tissue 1.7cm mass present in the lower uterine segment/cervix which is abutting the endometrium. This could represent a low uterine fibroid, or a cervical mass. Tissue sampling may be indicated to r/o malignancy. Right ovary demonstrates normal arterial and venous flow. Left ovary obscured by bowel gas.        ROS:   As above      Patient Active Problem List    Diagnosis Date Noted    Severe obesity ([address] 2020     Past Medical History: Diagnosis Date    Hypercholesterolemia     Hypertension     Hypertension       Past Surgical History:   Procedure Laterality Date    HX COLONOSCOPY      MS BREAST SURGERY PROCEDURE UNLISTED Left     lump removal      OB History        2    Para        Term                AB        Living   2       SAB        IAB        Ectopic        Molar        Multiple        Live Births                  Social History     Tobacco Use    Smoking status: Former Smoker    Smokeless tobacco: Never Used   Substance Use Topics    Alcohol use: Yes     Comment: 2 per month/social      Family History   Problem Relation Age of Onset    Cancer Maternal Grandmother         lung      Current Outpatient Medications   Medication Sig    losartan (COZAAR) 50 mg tablet Take 50 mg by mouth daily.  clobetasoL (TEMOVATE) 0.05 % topical cream Apply  to affected area nightly.  conjugated estrogens (PREMARIN) 0.625 mg/gram vaginal cream Insert 0.5 g into vagina daily. Insert 0.5g vaginally for 21 days at bedtime. Then insert 1-3 nights weekly for maintenance therapy    lidocaine 5 % topical cream Apply  to affected area daily as needed for Pain.  atorvastatin (LIPITOR) 10 mg tablet TAKE 1 TABLET EVERY NIGHT AT BEDTIME    multivitamin (ONE A DAY) tablet Take 1 Tab by mouth daily.  ergocalciferol (ERGOCALCIFEROL) 1,250 mcg (50,000 unit) capsule Take 50,000 Units by mouth every seven (7) days. No current facility-administered medications for this visit.      Allergies   Allergen Reactions    Latex Rash    Penicillins Hives          OBJECTIVE:    Physical Exam  VITAL SIGNS: Visit Vitals  BP (!) 115/47 (BP 1 Location: Left upper arm, BP Patient Position: Sitting)   Pulse 73   Temp 97.8 °F (36.6 °C) (Oral)   Ht 5' 6\" (1.676 m)   Wt 110.2 kg (243 lb)   SpO2 99%   BMI 39.22 kg/m²      GENERAL BREANNA: in no apparent distress and well developed and well nourished   MUSCULOSKEL: no joint tenderness, deformity or swelling INTEGUMENT:  warm and dry, no rashes or lesions   ABDOMEN . soft, NT, ND, No masses appreciated   EXTREMITIES: extremities normal, atraumatic, no cyanosis or edema   PELVIC: External genitalia: obliteration of normal anatomy with erythema c/w lichen sclerosis  2 cm firm area right lower perineum, tender to touch   RECTAL: deferred   ROVERTO SURVEY: Cervical, supraclavicular, axillary and inguinal nodes normal.   NEURO: Grossly normal          IMPRESSION/PLAN:  1. Lichen sclerosis   -no need for biopsy at this time   -pt info regarding vulvar hygiene given to patient   -clobetasol prn   - follow-up as scheduled    2. Vulvovaginal pain, intermittent   -Cont premarin     3. Vulvar nodule   -Discussed likely inflamed hair follicle. No indication for biopsy. Recommend warm compresses and anti-inflammatory. If continues to get worse discussed recommended coming back for follow-up.   Patient agreeable with plan         The total time spent was 25 minutes regarding this patients diagnosis of lichen sclerosis, cervical mass and >50% of this time was spent counseling and coordinating care    [address] [place] [address]  Gynecologic Oncology  3/8/539459:21 PM

## 2022-03-19 PROBLEM — E66.01 SEVERE OBESITY (HCC): Status: ACTIVE | Noted: 2020-03-24

## 2024-07-28 ENCOUNTER — APPOINTMENT (OUTPATIENT)
Facility: HOSPITAL | Age: 79
DRG: 149 | End: 2024-07-28
Payer: MEDICARE

## 2024-07-28 ENCOUNTER — HOSPITAL ENCOUNTER (INPATIENT)
Facility: HOSPITAL | Age: 79
LOS: 1 days | Discharge: HOME | DRG: 149 | End: 2024-07-30
Attending: EMERGENCY MEDICINE | Admitting: FAMILY MEDICINE
Payer: MEDICARE

## 2024-07-28 DIAGNOSIS — R55 NEAR SYNCOPE: Primary | ICD-10-CM

## 2024-07-28 DIAGNOSIS — T50.905A ADVERSE EFFECT DUE TO CORRECT MEDICINAL SUBSTANCE, PROPERLY GIVEN, INITIAL ENCOUNTER: ICD-10-CM

## 2024-07-28 DIAGNOSIS — R42 LIGHTHEADEDNESS: ICD-10-CM

## 2024-07-28 LAB
ALBUMIN SERPL-MCNC: 4 G/DL (ref 3.5–5)
ALBUMIN/GLOB SERPL: 1 (ref 1.1–2.2)
ALP SERPL-CCNC: 101 U/L (ref 45–117)
ALT SERPL-CCNC: 63 U/L (ref 12–78)
ANION GAP SERPL CALC-SCNC: 2 MMOL/L (ref 5–15)
APPEARANCE UR: CLEAR
AST SERPL-CCNC: 67 U/L (ref 15–37)
BACTERIA URNS QL MICRO: NEGATIVE /HPF
BASOPHILS # BLD: 0.1 K/UL (ref 0–0.1)
BASOPHILS NFR BLD: 1 % (ref 0–1)
BILIRUB SERPL-MCNC: 0.8 MG/DL (ref 0.2–1)
BILIRUB UR QL: NEGATIVE
BUN SERPL-MCNC: 20 MG/DL (ref 6–20)
BUN/CREAT SERPL: 19 (ref 12–20)
CALCIUM SERPL-MCNC: 9.3 MG/DL (ref 8.5–10.1)
CHLORIDE SERPL-SCNC: 106 MMOL/L (ref 97–108)
CO2 SERPL-SCNC: 30 MMOL/L (ref 21–32)
COLOR UR: ABNORMAL
COMMENT:: NORMAL
CREAT SERPL-MCNC: 1.07 MG/DL (ref 0.55–1.02)
D DIMER PPP FEU-MCNC: 0.44 MG/L FEU (ref 0–0.65)
DIFFERENTIAL METHOD BLD: NORMAL
EKG ATRIAL RATE: 59 BPM
EKG DIAGNOSIS: NORMAL
EKG P AXIS: 61 DEGREES
EKG P-R INTERVAL: 186 MS
EKG Q-T INTERVAL: 404 MS
EKG QRS DURATION: 94 MS
EKG QTC CALCULATION (BAZETT): 399 MS
EKG R AXIS: 60 DEGREES
EKG T AXIS: -3 DEGREES
EKG VENTRICULAR RATE: 59 BPM
EOSINOPHIL # BLD: 0.2 K/UL (ref 0–0.4)
EOSINOPHIL NFR BLD: 3 % (ref 0–7)
EPITH CASTS URNS QL MICRO: ABNORMAL /LPF
ERYTHROCYTE [DISTWIDTH] IN BLOOD BY AUTOMATED COUNT: 13 % (ref 11.5–14.5)
FLUAV RNA SPEC QL NAA+PROBE: NOT DETECTED
FLUBV RNA SPEC QL NAA+PROBE: NOT DETECTED
GLOBULIN SER CALC-MCNC: 3.9 G/DL (ref 2–4)
GLUCOSE SERPL-MCNC: 123 MG/DL (ref 65–100)
GLUCOSE UR STRIP.AUTO-MCNC: NEGATIVE MG/DL
HCT VFR BLD AUTO: 41.9 % (ref 35–47)
HGB BLD-MCNC: 13.9 G/DL (ref 11.5–16)
HGB UR QL STRIP: NEGATIVE
HYALINE CASTS URNS QL MICRO: ABNORMAL /LPF (ref 0–5)
IMM GRANULOCYTES # BLD AUTO: 0 K/UL (ref 0–0.04)
IMM GRANULOCYTES NFR BLD AUTO: 0 % (ref 0–0.5)
KETONES UR QL STRIP.AUTO: ABNORMAL MG/DL
LEUKOCYTE ESTERASE UR QL STRIP.AUTO: NEGATIVE
LIPASE SERPL-CCNC: 26 U/L (ref 13–75)
LYMPHOCYTES # BLD: 1.5 K/UL (ref 0.8–3.5)
LYMPHOCYTES NFR BLD: 19 % (ref 12–49)
MCH RBC QN AUTO: 29.8 PG (ref 26–34)
MCHC RBC AUTO-ENTMCNC: 33.2 G/DL (ref 30–36.5)
MCV RBC AUTO: 89.7 FL (ref 80–99)
MONOCYTES # BLD: 0.7 K/UL (ref 0–1)
MONOCYTES NFR BLD: 8 % (ref 5–13)
NEUTS SEG # BLD: 5.7 K/UL (ref 1.8–8)
NEUTS SEG NFR BLD: 69 % (ref 32–75)
NITRITE UR QL STRIP.AUTO: NEGATIVE
NRBC # BLD: 0 K/UL (ref 0–0.01)
NRBC BLD-RTO: 0 PER 100 WBC
NT PRO BNP: 156 PG/ML
PH UR STRIP: 5 (ref 5–8)
PLATELET # BLD AUTO: 257 K/UL (ref 150–400)
PMV BLD AUTO: 9.8 FL (ref 8.9–12.9)
POTASSIUM SERPL-SCNC: 5.4 MMOL/L (ref 3.5–5.1)
POTASSIUM SERPL-SCNC: 6.1 MMOL/L (ref 3.5–5.1)
PROT SERPL-MCNC: 7.9 G/DL (ref 6.4–8.2)
PROT UR STRIP-MCNC: ABNORMAL MG/DL
RBC # BLD AUTO: 4.67 M/UL (ref 3.8–5.2)
RBC #/AREA URNS HPF: ABNORMAL /HPF (ref 0–5)
SARS-COV-2 RNA RESP QL NAA+PROBE: NOT DETECTED
SODIUM SERPL-SCNC: 138 MMOL/L (ref 136–145)
SP GR UR REFRACTOMETRY: 1.03 (ref 1–1.03)
SPECIMEN HOLD: NORMAL
SPECIMEN HOLD: NORMAL
TROPONIN I SERPL HS-MCNC: 5 NG/L (ref 0–51)
UROBILINOGEN UR QL STRIP.AUTO: 1 EU/DL (ref 0.2–1)
WBC # BLD AUTO: 8.1 K/UL (ref 3.6–11)
WBC URNS QL MICRO: ABNORMAL /HPF (ref 0–4)

## 2024-07-28 PROCEDURE — 96360 HYDRATION IV INFUSION INIT: CPT

## 2024-07-28 PROCEDURE — 2580000003 HC RX 258: Performed by: FAMILY MEDICINE

## 2024-07-28 PROCEDURE — G0378 HOSPITAL OBSERVATION PER HR: HCPCS

## 2024-07-28 PROCEDURE — 84132 ASSAY OF SERUM POTASSIUM: CPT

## 2024-07-28 PROCEDURE — 81001 URINALYSIS AUTO W/SCOPE: CPT

## 2024-07-28 PROCEDURE — 87636 SARSCOV2 & INF A&B AMP PRB: CPT

## 2024-07-28 PROCEDURE — 85025 COMPLETE CBC W/AUTO DIFF WBC: CPT

## 2024-07-28 PROCEDURE — 84484 ASSAY OF TROPONIN QUANT: CPT

## 2024-07-28 PROCEDURE — 85379 FIBRIN DEGRADATION QUANT: CPT

## 2024-07-28 PROCEDURE — 2580000003 HC RX 258: Performed by: EMERGENCY MEDICINE

## 2024-07-28 PROCEDURE — 93010 ELECTROCARDIOGRAM REPORT: CPT | Performed by: SPECIALIST

## 2024-07-28 PROCEDURE — 83880 ASSAY OF NATRIURETIC PEPTIDE: CPT

## 2024-07-28 PROCEDURE — 83690 ASSAY OF LIPASE: CPT

## 2024-07-28 PROCEDURE — 80053 COMPREHEN METABOLIC PANEL: CPT

## 2024-07-28 PROCEDURE — 71046 X-RAY EXAM CHEST 2 VIEWS: CPT

## 2024-07-28 PROCEDURE — 36415 COLL VENOUS BLD VENIPUNCTURE: CPT

## 2024-07-28 PROCEDURE — 70450 CT HEAD/BRAIN W/O DYE: CPT

## 2024-07-28 PROCEDURE — 99285 EMERGENCY DEPT VISIT HI MDM: CPT

## 2024-07-28 PROCEDURE — 96361 HYDRATE IV INFUSION ADD-ON: CPT

## 2024-07-28 PROCEDURE — 93005 ELECTROCARDIOGRAM TRACING: CPT | Performed by: EMERGENCY MEDICINE

## 2024-07-28 RX ORDER — MAGNESIUM SULFATE IN WATER 40 MG/ML
2000 INJECTION, SOLUTION INTRAVENOUS PRN
Status: DISCONTINUED | OUTPATIENT
Start: 2024-07-28 | End: 2024-07-30 | Stop reason: HOSPADM

## 2024-07-28 RX ORDER — NIFEDIPINE 30 MG/1
30 TABLET, EXTENDED RELEASE ORAL DAILY
Status: DISCONTINUED | OUTPATIENT
Start: 2024-07-29 | End: 2024-07-30 | Stop reason: HOSPADM

## 2024-07-28 RX ORDER — ASPIRIN 81 MG/1
81 TABLET, CHEWABLE ORAL DAILY
Status: DISCONTINUED | OUTPATIENT
Start: 2024-07-29 | End: 2024-07-30 | Stop reason: HOSPADM

## 2024-07-28 RX ORDER — ONDANSETRON 2 MG/ML
4 INJECTION INTRAMUSCULAR; INTRAVENOUS EVERY 6 HOURS PRN
Status: DISCONTINUED | OUTPATIENT
Start: 2024-07-28 | End: 2024-07-30 | Stop reason: HOSPADM

## 2024-07-28 RX ORDER — ASPIRIN 300 MG/1
300 SUPPOSITORY RECTAL DAILY
Status: DISCONTINUED | OUTPATIENT
Start: 2024-07-29 | End: 2024-07-30 | Stop reason: HOSPADM

## 2024-07-28 RX ORDER — SODIUM CHLORIDE 9 MG/ML
INJECTION, SOLUTION INTRAVENOUS PRN
Status: DISCONTINUED | OUTPATIENT
Start: 2024-07-28 | End: 2024-07-30 | Stop reason: HOSPADM

## 2024-07-28 RX ORDER — POTASSIUM CHLORIDE 750 MG/1
40 TABLET, FILM COATED, EXTENDED RELEASE ORAL PRN
Status: DISCONTINUED | OUTPATIENT
Start: 2024-07-28 | End: 2024-07-30 | Stop reason: HOSPADM

## 2024-07-28 RX ORDER — SODIUM CHLORIDE 9 MG/ML
INJECTION, SOLUTION INTRAVENOUS CONTINUOUS
Status: DISCONTINUED | OUTPATIENT
Start: 2024-07-28 | End: 2024-07-28

## 2024-07-28 RX ORDER — ATORVASTATIN CALCIUM 40 MG/1
80 TABLET, FILM COATED ORAL NIGHTLY
Status: DISCONTINUED | OUTPATIENT
Start: 2024-07-28 | End: 2024-07-29

## 2024-07-28 RX ORDER — SODIUM CHLORIDE 0.9 % (FLUSH) 0.9 %
5-40 SYRINGE (ML) INJECTION PRN
Status: DISCONTINUED | OUTPATIENT
Start: 2024-07-28 | End: 2024-07-30 | Stop reason: HOSPADM

## 2024-07-28 RX ORDER — SODIUM CHLORIDE 0.9 % (FLUSH) 0.9 %
5-40 SYRINGE (ML) INJECTION EVERY 12 HOURS SCHEDULED
Status: DISCONTINUED | OUTPATIENT
Start: 2024-07-28 | End: 2024-07-30 | Stop reason: HOSPADM

## 2024-07-28 RX ORDER — 0.9 % SODIUM CHLORIDE 0.9 %
500 INTRAVENOUS SOLUTION INTRAVENOUS ONCE
Status: COMPLETED | OUTPATIENT
Start: 2024-07-28 | End: 2024-07-28

## 2024-07-28 RX ORDER — POLYETHYLENE GLYCOL 3350 17 G/17G
17 POWDER, FOR SOLUTION ORAL DAILY PRN
Status: DISCONTINUED | OUTPATIENT
Start: 2024-07-28 | End: 2024-07-30 | Stop reason: HOSPADM

## 2024-07-28 RX ORDER — ACETAMINOPHEN 325 MG/1
650 TABLET ORAL EVERY 6 HOURS PRN
Status: DISCONTINUED | OUTPATIENT
Start: 2024-07-28 | End: 2024-07-30 | Stop reason: HOSPADM

## 2024-07-28 RX ORDER — ACETAMINOPHEN 650 MG/1
650 SUPPOSITORY RECTAL EVERY 6 HOURS PRN
Status: DISCONTINUED | OUTPATIENT
Start: 2024-07-28 | End: 2024-07-30 | Stop reason: HOSPADM

## 2024-07-28 RX ORDER — ONDANSETRON 4 MG/1
4 TABLET, ORALLY DISINTEGRATING ORAL EVERY 8 HOURS PRN
Status: DISCONTINUED | OUTPATIENT
Start: 2024-07-28 | End: 2024-07-30 | Stop reason: HOSPADM

## 2024-07-28 RX ORDER — POTASSIUM CHLORIDE 7.45 MG/ML
10 INJECTION INTRAVENOUS PRN
Status: DISCONTINUED | OUTPATIENT
Start: 2024-07-28 | End: 2024-07-30 | Stop reason: HOSPADM

## 2024-07-28 RX ADMIN — SODIUM CHLORIDE 500 ML: 9 INJECTION, SOLUTION INTRAVENOUS at 19:22

## 2024-07-28 RX ADMIN — SODIUM CHLORIDE: 9 INJECTION, SOLUTION INTRAVENOUS at 17:38

## 2024-07-28 RX ADMIN — Medication 10 ML: at 21:27

## 2024-07-28 ASSESSMENT — PAIN - FUNCTIONAL ASSESSMENT: PAIN_FUNCTIONAL_ASSESSMENT: 0-10

## 2024-07-28 ASSESSMENT — PAIN SCALES - GENERAL
PAINLEVEL_OUTOF10: 0

## 2024-07-28 NOTE — ED TRIAGE NOTES
Pt arrives from home for hypotension and fatigue. Pt reports feeling dizzy over the past few days and is generally not feeling well. Pt recently had blood pressure med changed due to hypertension.

## 2024-07-28 NOTE — ED PROVIDER NOTES
[facility] EMERGENCY DEP  EMERGENCY DEPARTMENT ENCOUNTER      Pt Name: Dwayne Ceja  MRN: [id number]  Birthdate 1945  Date of evaluation: 7/28/2024  Provider: Jewel Guo MD    CHIEF COMPLAINT       Chief Complaint   Patient presents with    Fatigue    Dizziness         HISTORY OF PRESENT ILLNESS   (Location/Symptom, Timing/Onset, Context/Setting, Quality, Duration, Modifying Factors, Severity)  Note limiting factors.   HPI  78 y.o. female patient presents from home accompanied by son with a chief complaint of dizziness, disorientation, and near syncope following a fall on Friday. The patient reports difficulty waking up and almost passing out in the shower. The patient's daughter-in-law, a nurse practitioner, measured their blood pressure, which was found to be low. Her heartrate has been low (50s-60s) on her smart watch. PMHx significant for hypertension, with recent adjustments in medication to manage it. The patient also reports bilateral arm pain with sensation of swelling, in addition to feeling sluggish and dazed. Current medications include Losartan 100 mg and Nifedipine 30 mg for blood pressure management.    Review of External Medical Records:   PCP note 6/4 with losartan increased to 100mg after /76 and HR 60 in clinic  PCP nursing visit 7/3 with /74 and pulse 60 and nifedipine SR 30mg daily added at that time  PCP nursing visit 7/16 with /70    Nursing Notes were reviewed.    REVIEW OF SYSTEMS    (2-9 systems for level 4, 10 or more for level 5)     Review of Systems    Except as noted above the remainder of the review of systems was reviewed and negative.       PAST MEDICAL HISTORY     Past Medical History:   Diagnosis Date    Hypercholesterolemia     Hypertension     Hypertension          SURGICAL HISTORY       Past Surgical History:   Procedure Laterality Date    BREAST SURGERY Left     lump removal    COLONOSCOPY           CURRENT MEDICATIONS       Previous Medications

## 2024-07-29 ENCOUNTER — APPOINTMENT (OUTPATIENT)
Facility: HOSPITAL | Age: 79
DRG: 149 | End: 2024-07-29
Payer: MEDICARE

## 2024-07-29 ENCOUNTER — APPOINTMENT (OUTPATIENT)
Facility: HOSPITAL | Age: 79
DRG: 149 | End: 2024-07-29
Attending: FAMILY MEDICINE
Payer: MEDICARE

## 2024-07-29 LAB
ANION GAP SERPL CALC-SCNC: 4 MMOL/L (ref 5–15)
BASOPHILS # BLD: 0.1 K/UL (ref 0–0.1)
BASOPHILS NFR BLD: 1 % (ref 0–1)
BUN SERPL-MCNC: 22 MG/DL (ref 6–20)
BUN/CREAT SERPL: 28 (ref 12–20)
CALCIUM SERPL-MCNC: 8.8 MG/DL (ref 8.5–10.1)
CHLORIDE SERPL-SCNC: 111 MMOL/L (ref 97–108)
CHOLEST SERPL-MCNC: 121 MG/DL
CO2 SERPL-SCNC: 25 MMOL/L (ref 21–32)
CREAT SERPL-MCNC: 0.8 MG/DL (ref 0.55–1.02)
DIFFERENTIAL METHOD BLD: NORMAL
ECHO AV PEAK GRADIENT: 9 MMHG
ECHO AV PEAK VELOCITY: 1.5 M/S
ECHO AV VELOCITY RATIO: 0.67
ECHO BSA: 2.28 M2
ECHO LA VOL A-L A4C: 105 ML (ref 22–52)
ECHO LA VOL MOD A4C: 98 ML (ref 22–52)
ECHO LA VOLUME INDEX A-L A4C: 48 ML/M2 (ref 16–34)
ECHO LA VOLUME INDEX MOD A4C: 45 ML/M2 (ref 16–34)
ECHO LV FRACTIONAL SHORTENING: 35 % (ref 28–44)
ECHO LV INTERNAL DIMENSION DIASTOLE INDEX: 2.25 CM/M2
ECHO LV INTERNAL DIMENSION DIASTOLIC: 4.9 CM (ref 3.9–5.3)
ECHO LV INTERNAL DIMENSION SYSTOLIC INDEX: 1.47 CM/M2
ECHO LV INTERNAL DIMENSION SYSTOLIC: 3.2 CM
ECHO LV IVSD: 1 CM (ref 0.6–0.9)
ECHO LV MASS 2D: 164.3 G (ref 67–162)
ECHO LV MASS INDEX 2D: 75.4 G/M2 (ref 43–95)
ECHO LV POSTERIOR WALL DIASTOLIC: 0.9 CM (ref 0.6–0.9)
ECHO LV RELATIVE WALL THICKNESS RATIO: 0.37
ECHO LVOT PEAK GRADIENT: 4 MMHG
ECHO LVOT PEAK VELOCITY: 1 M/S
ECHO MV AREA PHT: 1.9 CM2
ECHO MV E VELOCITY: 0.97 M/S
ECHO MV MAX VELOCITY: 1.1 M/S
ECHO MV MEAN GRADIENT: 2 MMHG
ECHO MV MEAN VELOCITY: 0.6 M/S
ECHO MV PEAK GRADIENT: 4 MMHG
ECHO MV PRESSURE HALF TIME (PHT): 116.2 MS
ECHO MV VTI: 35.2 CM
ECHO RV INTERNAL DIMENSION: 3.1 CM
ECHO RV TAPSE: 2.2 CM (ref 1.7–?)
ECHO TV REGURGITANT MAX VELOCITY: 2.38 M/S
ECHO TV REGURGITANT PEAK GRADIENT: 23 MMHG
EOSINOPHIL # BLD: 0.2 K/UL (ref 0–0.4)
EOSINOPHIL NFR BLD: 3 % (ref 0–7)
ERYTHROCYTE [DISTWIDTH] IN BLOOD BY AUTOMATED COUNT: 12.9 % (ref 11.5–14.5)
EST. AVERAGE GLUCOSE BLD GHB EST-MCNC: 117 MG/DL
GLUCOSE SERPL-MCNC: 132 MG/DL (ref 65–100)
HBA1C MFR BLD: 5.7 % (ref 4–5.6)
HCT VFR BLD AUTO: 37.5 % (ref 35–47)
HDLC SERPL-MCNC: 48 MG/DL
HDLC SERPL: 2.5 (ref 0–5)
HGB BLD-MCNC: 12.4 G/DL (ref 11.5–16)
IMM GRANULOCYTES # BLD AUTO: 0 K/UL (ref 0–0.04)
IMM GRANULOCYTES NFR BLD AUTO: 0 % (ref 0–0.5)
LDLC SERPL CALC-MCNC: 52.4 MG/DL (ref 0–100)
LYMPHOCYTES # BLD: 2 K/UL (ref 0.8–3.5)
LYMPHOCYTES NFR BLD: 25 % (ref 12–49)
MCH RBC QN AUTO: 29.7 PG (ref 26–34)
MCHC RBC AUTO-ENTMCNC: 33.1 G/DL (ref 30–36.5)
MCV RBC AUTO: 89.7 FL (ref 80–99)
MONOCYTES # BLD: 0.9 K/UL (ref 0–1)
MONOCYTES NFR BLD: 11 % (ref 5–13)
NEUTS SEG # BLD: 4.7 K/UL (ref 1.8–8)
NEUTS SEG NFR BLD: 60 % (ref 32–75)
NRBC # BLD: 0 K/UL (ref 0–0.01)
NRBC BLD-RTO: 0 PER 100 WBC
PLATELET # BLD AUTO: 209 K/UL (ref 150–400)
PMV BLD AUTO: 9.6 FL (ref 8.9–12.9)
POTASSIUM SERPL-SCNC: 3.7 MMOL/L (ref 3.5–5.1)
POTASSIUM SERPL-SCNC: 3.7 MMOL/L (ref 3.5–5.1)
RBC # BLD AUTO: 4.18 M/UL (ref 3.8–5.2)
SODIUM SERPL-SCNC: 140 MMOL/L (ref 136–145)
TRIGL SERPL-MCNC: 103 MG/DL
VLDLC SERPL CALC-MCNC: 20.6 MG/DL
WBC # BLD AUTO: 7.9 K/UL (ref 3.6–11)

## 2024-07-29 PROCEDURE — 97165 OT EVAL LOW COMPLEX 30 MIN: CPT

## 2024-07-29 PROCEDURE — 6360000002 HC RX W HCPCS

## 2024-07-29 PROCEDURE — 70544 MR ANGIOGRAPHY HEAD W/O DYE: CPT

## 2024-07-29 PROCEDURE — 70547 MR ANGIOGRAPHY NECK W/O DYE: CPT

## 2024-07-29 PROCEDURE — 80061 LIPID PANEL: CPT

## 2024-07-29 PROCEDURE — 92610 EVALUATE SWALLOWING FUNCTION: CPT

## 2024-07-29 PROCEDURE — 6370000000 HC RX 637 (ALT 250 FOR IP): Performed by: FAMILY MEDICINE

## 2024-07-29 PROCEDURE — G0378 HOSPITAL OBSERVATION PER HR: HCPCS

## 2024-07-29 PROCEDURE — 6370000000 HC RX 637 (ALT 250 FOR IP)

## 2024-07-29 PROCEDURE — 1100000003 HC PRIVATE W/ TELEMETRY

## 2024-07-29 PROCEDURE — 83036 HEMOGLOBIN GLYCOSYLATED A1C: CPT

## 2024-07-29 PROCEDURE — 2580000003 HC RX 258: Performed by: FAMILY MEDICINE

## 2024-07-29 PROCEDURE — 97116 GAIT TRAINING THERAPY: CPT

## 2024-07-29 PROCEDURE — 93306 TTE W/DOPPLER COMPLETE: CPT

## 2024-07-29 PROCEDURE — 97530 THERAPEUTIC ACTIVITIES: CPT

## 2024-07-29 PROCEDURE — 80048 BASIC METABOLIC PNL TOTAL CA: CPT

## 2024-07-29 PROCEDURE — 97161 PT EVAL LOW COMPLEX 20 MIN: CPT

## 2024-07-29 PROCEDURE — 85025 COMPLETE CBC W/AUTO DIFF WBC: CPT

## 2024-07-29 PROCEDURE — 99223 1ST HOSP IP/OBS HIGH 75: CPT | Performed by: NURSE PRACTITIONER

## 2024-07-29 PROCEDURE — 36415 COLL VENOUS BLD VENIPUNCTURE: CPT

## 2024-07-29 RX ORDER — LOSARTAN POTASSIUM 50 MG/1
50 TABLET ORAL DAILY
Status: DISCONTINUED | OUTPATIENT
Start: 2024-07-29 | End: 2024-07-30 | Stop reason: HOSPADM

## 2024-07-29 RX ORDER — ATORVASTATIN CALCIUM 40 MG/1
40 TABLET, FILM COATED ORAL NIGHTLY
Status: DISCONTINUED | OUTPATIENT
Start: 2024-07-29 | End: 2024-07-29

## 2024-07-29 RX ORDER — ATORVASTATIN CALCIUM 10 MG/1
10 TABLET, FILM COATED ORAL NIGHTLY
Status: DISCONTINUED | OUTPATIENT
Start: 2024-07-29 | End: 2024-07-30 | Stop reason: HOSPADM

## 2024-07-29 RX ORDER — HYDRALAZINE HYDROCHLORIDE 20 MG/ML
5 INJECTION INTRAMUSCULAR; INTRAVENOUS EVERY 6 HOURS PRN
Status: DISCONTINUED | OUTPATIENT
Start: 2024-07-29 | End: 2024-07-30 | Stop reason: HOSPADM

## 2024-07-29 RX ADMIN — LOSARTAN POTASSIUM 50 MG: 50 TABLET, FILM COATED ORAL at 14:20

## 2024-07-29 RX ADMIN — SODIUM CHLORIDE, PRESERVATIVE FREE 10 ML: 5 INJECTION INTRAVENOUS at 20:12

## 2024-07-29 RX ADMIN — ATORVASTATIN CALCIUM 80 MG: 40 TABLET, FILM COATED ORAL at 00:48

## 2024-07-29 RX ADMIN — HYDRALAZINE HYDROCHLORIDE 5 MG: 20 INJECTION INTRAMUSCULAR; INTRAVENOUS at 21:45

## 2024-07-29 RX ADMIN — SODIUM CHLORIDE, PRESERVATIVE FREE 10 ML: 5 INJECTION INTRAVENOUS at 08:48

## 2024-07-29 RX ADMIN — ASPIRIN 81 MG CHEWABLE TABLET 81 MG: 81 TABLET CHEWABLE at 08:48

## 2024-07-29 RX ADMIN — SODIUM CHLORIDE, PRESERVATIVE FREE 10 ML: 5 INJECTION INTRAVENOUS at 00:54

## 2024-07-29 RX ADMIN — ATORVASTATIN CALCIUM 10 MG: 10 TABLET, FILM COATED ORAL at 20:12

## 2024-07-29 ASSESSMENT — PAIN SCALES - GENERAL
PAINLEVEL_OUTOF10: 0

## 2024-07-29 NOTE — PROGRESS NOTES
2nd Attempt to deliver and verbally explain the VOON with patient. Patient was with clinical staff. Consuelo Magaña, Care Management Assistant

## 2024-07-29 NOTE — PROGRESS NOTES
Spiritual Care Assessment/Progress Note  [facility]    Name: Dwayne Ceja MRN: [id number]    Age: 78 y.o.     Sex: female   Language: English     Date: 7/29/2024            Total Time Calculated: 20 min              Spiritual Assessment begun in [facility] MED SURG  Service Provided For: Patient  Referral/Consult From: Rounding  Encounter Overview/Reason: Initial Encounter    Spiritual beliefs:      [x] Involved in a mercy tradition/spiritual practice: Jainism     [] Supported by a mercy community:      [] Claims no spiritual orientation:      [] Seeking spiritual identity:           [] Adheres to an individual form of spirituality:      [] Not able to assess:                Identified resources for coping and support system:   Support System: Children       [x] Prayer                  [] Devotional reading               [] Music                  [] Guided Imagery     [] Pet visits                                        [] Other: (COMMENT)     Specific area/focus of visit   Encounter:    Crisis:    Spiritual/Emotional needs: Type: Spiritual Support  Ritual, Rites and Sacraments:    Grief, Loss, and Adjustments:    Ethics/Mediation:    Behavioral Health:    Palliative Care:    Advance Care Planning:           Narrative:     The  visited the patient. The patient was calm, hopeful, and at peace at the time of the visit. The patient engaged in conversation. The patient expressed feelings. The  actively listened to the patient. The  assured prayers. The  ended the conversation by informing the patient to contact the Spiritual Care Services for any further support.    Clare Parada M.Div., Th.M., M.A.C.E.   [facility]  Spiritual Health Services  Paging Service [phone] (IMAN)

## 2024-07-29 NOTE — CONSULTS
Ar       Past Medical History:   Diagnosis Date    Hypercholesterolemia     Hypertension     Hypertension         Past Surgical History:   Procedure Laterality Date    BREAST SURGERY Left     lump removal    COLONOSCOPY          Social History     Tobacco Use    Smoking status: Former    Smokeless tobacco: Never   Substance Use Topics    Alcohol use: Yes        Family History   Problem Relation Age of Onset    Cancer Maternal Grandmother         lung        Patient denies headache, weakness, fatigue, fevers, chills, blurred vision, numbness/tingling, CP or SOB.      Review of Systems:   Comprehensive review of systems performed and negative except for as listed above.     Exam:     /73   Pulse 59   Temp 97.9 °F (36.6 °C)   Resp 16   Ht 1.676 m (5' 6\")   Wt 111.2 kg (245 lb 2.4 oz)   SpO2 97%   BMI 39.57 kg/m²      General: Well developed, well nourished. Patient in no apparent distress   Head: Normocephalic, atraumatic, anicteric sclera   Lungs:  No increased WOB   Cardiac: RRR                  Neurological Exam:  Mental Status: A&Ox3, Follows commands   Speech: Fluent no aphasia or dysarthria.   Cranial Nerves:   VFF.  Facial sensation is normal. Facial movement is symmetric.  Palate is midline.  Normal sternocleidomastoid strength. Tongue is midline. Hearing is intact bilat.   Eyes: PERRL, EOM's full, no nystagmus, no ptosis.   Motor:  5/5 x4. Normal bulk and tone.   Reflexes:   DTR 2+/4 and symmetrical.  Toes downgoing bilat.    Sensory:   Sensation intact to LT bilat throughout   Gait:  Deferred   Tremor:   No tremor noted.   Cerebellar:  FTN intact           Imaging  I personally reviewed the following images.   CT Results (maximum last 3):  CT Result (most recent):  CT HEAD WO CONTRAST 07/28/2024    Narrative  EXAM: CT CODE NEURO HEAD WO CONTRAST    INDICATION: lightheadedness    COMPARISON: None.    CONTRAST: None.    TECHNIQUE: Unenhanced CT of the head was performed using 5 mm images. Brain

## 2024-07-29 NOTE — PROGRESS NOTES
Attempted to deliver and verbally explain the /VOON with patient. Patient was with clinical staff. Consuelo Magaña, Care Management Assistant

## 2024-07-29 NOTE — ED NOTES
Ambulated pt around the nursing station. Pt reports feeling \"off\" dizzy, lightheaded, and wobbly. MD made aware    Pt is back to the room, placed on monitor x3, call light within reach. Son at bedside   
Bedside and Verbal shift change report given to FROILAN Wilkins (oncoming nurse) by FROILAN Recinos (offgoing nurse). Report included the following information Nurse Handoff Report, ED Encounter Summary, ED SBAR, Recent Results, and Cardiac Rhythm NSR .     
Orthostatic BP and HR performed:  Lying /57 HR 59  Sitting /58 HR 66  Standing /60 HR 60  
symptoms 07/28/24 1546   Infiltration Assessment 0 07/28/24 1546   Dressing Status Clean, dry & intact 07/28/24 1546   Dressing Type Transparent 07/28/24 1546   Dressing Intervention New 07/28/24 1546     PO Status: Regular  Pertinent or High Risk Medications/Drips: no   If Yes, please provide details:     Titratable drips: no   Pending Blood Product Administration: no     Sepsis    Sepsis Risk Score   Predictive Model Details          3 (Low)  Factor Value    Calculated 7/28/2024 23:04 16% O2 Delivery Method ROOM AIR    [facility] EARLY DETECTION OF SEPSIS VERSION 2 Model 7% Total Active Inpatient Meds 12     5% Age 78 years old     4% Has Imaging Procedure 1     -4% RBC Count 4.67 M/uL     -4% Platelet Count 257 K/uL     -4% Relative Neutrophils 69 %     -3% BMI 39.7     3% AST 67 U/L     -3% Pulse 68      Recent Labs     07/28/24  1541   WBC 8.1         Recommendation    Pending orders am labs  Consults ordered: IP CONSULT TO HOSPITALIST    Consulted provider:      Plan for next 24 hours: observation  Additional Comments: N/A     If any further questions, please call Sending RN at [phone]  Electronically signed by: Electronically signed by Claudette Forte RN on 7/28/2024 at 11:13 PM

## 2024-07-29 NOTE — PROGRESS NOTES
Speech LAnguage Pathology EVALUATION/DISCHARGE    Patient: Dwayne Ceja (78 y.o. female)  Date: 7/29/2024  Primary Diagnosis: Lightheadedness [R42]  Near syncope [R55]  Adverse effect due to correct medicinal substance, properly given, initial encounter [T52.588Q]       Precautions:  Fall Risk                  ASSESSMENT :    Patient presents with WNL oropharyngeal swallow function with all consistencies and appears safe to continue with regular textured solids and thin liquids at this time. Patient with no cognitive or communication deficits observed to warrant further evaluation. ST to sign off this date.    Patient will be discharged from skilled speech-language pathology services at this time.     PLAN :  Recommendations and Planned Interventions:  Diet: Regular and thin liquids  Straws OK     Acute SLP Services: No, patient will be discharged from acute skilled speech-language pathology at this time.  Discharge Recommendations: No, additional SLP treatment not indicated at discharge     SUBJECTIVE:   Patient awake, alert, no c/o pain, receptive to clinician. Patient family endorses noted change in language processing observed yesterday however endorses patient is now back to baseline.     OBJECTIVE:     Past Medical History:   Diagnosis Date    Hypercholesterolemia     Hypertension     Hypertension      Past Surgical History:   Procedure Laterality Date    BREAST SURGERY Left     lump removal    COLONOSCOPY       Prior Level of Function/Home Situation:   Social/Functional History  Lives With: Alone ( recently moved to memory care)  Type of Home: Condo (14 steps up with left rail)  Home Layout: Two level, Bed/Bath upstairs  Home Access: Level entry  Bathroom Shower/Tub: Walk-in shower  Bathroom Toilet: Handicap height  Bathroom Equipment: Grab bars in shower  Home Equipment: None  Has the patient had two or more falls in the past year or any fall with injury in the past year?: No  Receives Help From:

## 2024-07-29 NOTE — PROGRESS NOTES
[facility] Adult  Hospitalist Group                                                                                          Hospitalist Progress Note  DEVYN Mejias NP  Answering service: [phone] OR [phone] from in house phone        Date of Service:  2024  NAME:  Dwayne Ceja  :  1945  MRN:  [id number]       Admission Summary:   Per H&P: Dwayne Ceja is a 78 y.o. female wiwth ap mhx HTN, and dyslipidemia who presents with  lightheadedness, and presyncope for the last three days.  Prior to this, she was markedly hypertensive with SBP in the 220's which prompted her PCP to increase her losartan dose, and start nifedipine.  She states that she started to feel dizzy and unwell thereafter.  During one episode, her daughter checked her BP, and SBP wa [id number] with HR 50-60.  Associated sx include some confusion per son who is at bedside.     In the ED, VSS, and orthostatics were negative.  Labs grossly unremarkable, but K+ will need rechecked due to sample hemolyzed times two.       In the ED, she received 500cc normal saline bolus     Interval history / Subjective:   Patient seen and examined this morning, sitting in chair. Feels like her head is heavy and notes swaying sensation. Reports that she has \"puffs\" when sleeping - based on her description, these sound like episodes of forced exhalation. Has not been worked up for sleep apnea previously. Has been under significant stress recently due to placing her  in memory care unit and moving out of her home of 40+ years - declines Psychiatry evaluation at this time.    Followed up with patient this afternoon, still feels weak and lightheaded. Plan to monitor overnight. Watch BP in setting of lower dose of losartan resumed this afternoon.     Likely discharge home in morning.     Assessment & Plan:     Lightheadedness  Confusion, resolved  - orthostatics negative and VSS  - head CT: no acute process   - MRA head/neck: two 2

## 2024-07-29 NOTE — H&P
as needed 10/5/20   Automatic Reconciliation, Ar   losartan (COZAAR) 50 MG tablet Take 50 mg by mouth daily 6/30/21   Automatic Reconciliation, Ar     Allergies   Allergen Reactions    Latex Rash    Penicillins Hives      Family History   Problem Relation Age of Onset    Cancer Maternal Grandmother         lung      Social History:  reports that she has quit smoking. She has never used smokeless tobacco. She reports current alcohol use. She reports that she does not use drugs.   Social Determinants of Health     Tobacco Use: Medium Risk (3/8/2022)    Patient History     Smoking Tobacco Use: Former     Smokeless Tobacco Use: Never     Passive Exposure: Not on file   Alcohol Use: Not on file   Financial Resource Strain: Not on file   Food Insecurity: Not on file   Transportation Needs: Not on file   Physical Activity: Not on file   Stress: Not on file   Social Connections: Not on file   Intimate Partner Violence: Not on file   Depression: Not at risk (3/8/2022)    PHQ-2     PHQ-2 Score: 0   Housing Stability: Not on file   Interpersonal Safety: Not on file   Utilities: Not on file        Medications were reconciled to the best of my ability given all available resources at the time of admission. Route is PO if not otherwise noted.     Family and social history were personally reviewed, all pertinent and relevant details are outlined as above.    Objective:   BP (!) 152/61   Pulse 61   Temp 97.7 °F (36.5 °C) (Oral)   Resp 15   Ht 1.676 m (5' 6\")   Wt 111.2 kg (245 lb 2.4 oz)   SpO2 100%   BMI 39.57 kg/m²         PHYSICAL EXAM:   General: Alert x oriented x 3, awake, no acute distress,   HEENT: PEERL, EOMI, moist mucus membranes  Neck: Supple, no JVD, no meningeal signs  Chest: Clear to auscultation bilaterally   CVS: RRR, S1 S2 heard, no murmurs/rubs/gallops  Abd: Soft, non-tender, non-distended, +bowel sounds   Ext: No clubbing, no cyanosis, no edema  Neuro/Psych: Pleasant mood and affect, CN 2-12 grossly

## 2024-07-30 VITALS
DIASTOLIC BLOOD PRESSURE: 62 MMHG | HEART RATE: 57 BPM | WEIGHT: 245.15 LBS | OXYGEN SATURATION: 94 % | SYSTOLIC BLOOD PRESSURE: 153 MMHG | RESPIRATION RATE: 17 BRPM | BODY MASS INDEX: 39.4 KG/M2 | HEIGHT: 66 IN | TEMPERATURE: 98.4 F

## 2024-07-30 LAB
ANION GAP SERPL CALC-SCNC: 6 MMOL/L (ref 5–15)
BASOPHILS # BLD: 0.1 K/UL (ref 0–0.1)
BASOPHILS NFR BLD: 1 % (ref 0–1)
BUN SERPL-MCNC: 15 MG/DL (ref 6–20)
BUN/CREAT SERPL: 18 (ref 12–20)
CALCIUM SERPL-MCNC: 9.1 MG/DL (ref 8.5–10.1)
CHLORIDE SERPL-SCNC: 109 MMOL/L (ref 97–108)
CO2 SERPL-SCNC: 25 MMOL/L (ref 21–32)
CREAT SERPL-MCNC: 0.85 MG/DL (ref 0.55–1.02)
DIFFERENTIAL METHOD BLD: NORMAL
EOSINOPHIL # BLD: 0.2 K/UL (ref 0–0.4)
EOSINOPHIL NFR BLD: 4 % (ref 0–7)
ERYTHROCYTE [DISTWIDTH] IN BLOOD BY AUTOMATED COUNT: 12.8 % (ref 11.5–14.5)
GLUCOSE SERPL-MCNC: 97 MG/DL (ref 65–100)
HCT VFR BLD AUTO: 37.2 % (ref 35–47)
HGB BLD-MCNC: 12.5 G/DL (ref 11.5–16)
IMM GRANULOCYTES # BLD AUTO: 0 K/UL (ref 0–0.04)
IMM GRANULOCYTES NFR BLD AUTO: 0 % (ref 0–0.5)
LYMPHOCYTES # BLD: 1.8 K/UL (ref 0.8–3.5)
LYMPHOCYTES NFR BLD: 29 % (ref 12–49)
MCH RBC QN AUTO: 29.8 PG (ref 26–34)
MCHC RBC AUTO-ENTMCNC: 33.6 G/DL (ref 30–36.5)
MCV RBC AUTO: 88.8 FL (ref 80–99)
MONOCYTES # BLD: 0.7 K/UL (ref 0–1)
MONOCYTES NFR BLD: 11 % (ref 5–13)
NEUTS SEG # BLD: 3.5 K/UL (ref 1.8–8)
NEUTS SEG NFR BLD: 55 % (ref 32–75)
NRBC # BLD: 0 K/UL (ref 0–0.01)
NRBC BLD-RTO: 0 PER 100 WBC
PLATELET # BLD AUTO: 196 K/UL (ref 150–400)
PMV BLD AUTO: 9.4 FL (ref 8.9–12.9)
POTASSIUM SERPL-SCNC: 4 MMOL/L (ref 3.5–5.1)
RBC # BLD AUTO: 4.19 M/UL (ref 3.8–5.2)
SODIUM SERPL-SCNC: 140 MMOL/L (ref 136–145)
WBC # BLD AUTO: 6.3 K/UL (ref 3.6–11)

## 2024-07-30 PROCEDURE — 80048 BASIC METABOLIC PNL TOTAL CA: CPT

## 2024-07-30 PROCEDURE — 6370000000 HC RX 637 (ALT 250 FOR IP): Performed by: FAMILY MEDICINE

## 2024-07-30 PROCEDURE — 85025 COMPLETE CBC W/AUTO DIFF WBC: CPT

## 2024-07-30 PROCEDURE — 6370000000 HC RX 637 (ALT 250 FOR IP)

## 2024-07-30 PROCEDURE — 36415 COLL VENOUS BLD VENIPUNCTURE: CPT

## 2024-07-30 RX ORDER — LOSARTAN POTASSIUM 100 MG/1
100 TABLET ORAL DAILY
Qty: 30 TABLET | Refills: 1 | Status: SHIPPED | OUTPATIENT
Start: 2024-07-30

## 2024-07-30 RX ADMIN — ACETAMINOPHEN 650 MG: 325 TABLET ORAL at 07:08

## 2024-07-30 RX ADMIN — ASPIRIN 81 MG CHEWABLE TABLET 81 MG: 81 TABLET CHEWABLE at 09:01

## 2024-07-30 RX ADMIN — LOSARTAN POTASSIUM 50 MG: 50 TABLET, FILM COATED ORAL at 09:01

## 2024-07-30 ASSESSMENT — PAIN SCALES - GENERAL: PAINLEVEL_OUTOF10: 0

## 2024-07-30 NOTE — PLAN OF CARE
Problem: Occupational Therapy - Adult  Goal: By Discharge: Performs self-care activities at highest level of function for planned discharge setting.  See evaluation for individualized goals.  Description: FUNCTIONAL STATUS PRIOR TO ADMISSION:  Patient was ambulatory using no DME and was independent for ADLs and IADLs including driving.    Receives Help From: Family (son involved in care), ADL Assistance: Independent, Homemaking Assistance: Independent, Ambulation Assistance: Independent, Transfer Assistance: Independent, Active : Yes     HOME SUPPORT: Patient lived alone with a son who lives locally and is involved in care.    Occupational Therapy Goals:  Initiated 7/29/2024  1.  Patient will perform grooming standing at sink with [place] within 7 day(s).  2.  Patient will perform lower body dressing with Greene within 7 day(s).  3.  Patient will perform bathing with Greene within 7 day(s).  4.  Patient will perform toilet transfers with Greene  within 7 day(s).  5.  Patient will perform all aspects of toileting with Greene within 7 day(s).  6.  Patient will participate in upper extremity therapeutic exercise/activities with Greene for 10 minutes within 7 day(s).    7.  Patient will utilize energy conservation techniques during functional activities with verbal cues within 7 day(s).  Outcome: Progressing   OCCUPATIONAL THERAPY EVALUATION    Patient: Dwayne Ceja (78 y.o. female)  Date: 7/29/2024  Primary Diagnosis: Lightheadedness [R42]  Near syncope [R55]  Adverse effect due to correct medicinal substance, properly given, initial encounter [T50.005O]         Precautions: Fall Risk                  ASSESSMENT :  The patient is limited by decreased functional mobility, independence in ADLs, high-level IADLs, strength, activity tolerance, endurance, balance, orthostatic hypotension. Patient initially presented with lightheadedness and presyncope for several days. Recent 
  Problem: Pain  Goal: Verbalizes/displays adequate comfort level or baseline comfort level  Outcome: Completed     Problem: Discharge Planning  Goal: Discharge to home or other facility with appropriate resources  Outcome: Completed  Flowsheets (Taken 7/30/2024 0902)  Discharge to home or other facility with appropriate resources:   Identify barriers to discharge with patient and caregiver   Arrange for needed discharge resources and transportation as appropriate     Problem: Safety - Adult  Goal: Free from fall injury  Outcome: Completed  Flowsheets (Taken 7/30/2024 1108)  Free From Fall Injury: Instruct family/caregiver on patient safety     
  Problem: Pain  Goal: Verbalizes/displays adequate comfort level or baseline comfort level  Outcome: Progressing     Problem: Discharge Planning  Goal: Discharge to home or other facility with appropriate resources  Outcome: Progressing  Flowsheets (Taken 7/29/2024 0850)  Discharge to home or other facility with appropriate resources:   Identify barriers to discharge with patient and caregiver   Arrange for needed discharge resources and transportation as appropriate     Problem: Safety - Adult  Goal: Free from fall injury  Outcome: Progressing  Flowsheets (Taken 7/29/2024 1035)  Free From Fall Injury: Instruct family/caregiver on patient safety     
within reach, and Side rails x3    COMMUNICATION/EDUCATION:   The patient's plan of care was discussed with: registered nurse    Patient Education  Education Given To: Patient  Education Provided: Role of Therapy;Plan of Care;Fall Prevention Strategies  Education Method: Verbal  Barriers to Learning: None  Education Outcome: Verbalized understanding    Thank you for this referral.  Madai Nye PT, DPT  Minutes: 19      Physical Therapy Evaluation Charge Determination   History Examination Presentation Decision-Making   MEDIUM  Complexity : 1-2 comorbidities / personal factors will impact the outcome/ POC  LOW Complexity : 1-2 Standardized tests and measures addressing body structure, function, activity limitation and / or participation in recreation  LOW Complexity : Stable, uncomplicated  AM-PAC  LOW    Based on the above components, the patient evaluation is determined to be of the following complexity level: Low

## 2024-07-30 NOTE — PROGRESS NOTES
[facility]  [facility]ospital[facility]                                                                                          Hospitalist Progress Note  Prashanth Spicer MD  Answering service: [phone] OR [phone] from in house phone        Date of Service:  2024  NAME:  Dwayne Ceja  :  1945  MRN:  [id number]       Admission Summary:   Per H&P: Dwayne Ceja is a 78 y.o. female wiwth ap mhx HTN, and dyslipidemia who presents with  lightheadedness, and presyncope for the last three days.  Prior to this, she was markedly hypertensive with SBP in the 220's which prompted her PCP to increase her losartan dose, and start nifedipine.  She states that she started to feel dizzy and unwell thereafter.  During one episode, her daughter checked her BP, and SBP wa [id number] with HR 50-60.  Associated sx include some confusion per son who is at bedside.     In the ED, VSS, and orthostatics were negative.  Labs grossly unremarkable, but K+ will need rechecked due to sample hemolyzed times two.       In the ED, she received 500cc normal saline bolus     Interval history / Subjective:   Follow up Lightheadedness  Feels much better  Headache earlier in the morning     Assessment & Plan:     Lightheadedness  Confusion, resolved  - orthostatics negative and VSS  - head CT: no acute process   - MRA head/neck: two 2 mm outpouchings of cavernous right ICA, which may represent small aneurysms, infundibula, or atherosclerotic irregularity  - A1c 5.7  - total cholesterol 121, LDL 52.4  - evaluated by Neurology: no indication for brain MRI, recommend outpatient sleep study for possible STEVE  - echo: EF 60-65%, severe annular calcification at posterior leaflet of mitral valve - mild regurgitation and no stenosis  - PT/OT following    2 mm outpouchings of cavernous right ICA  - as noted on head MRA  - outpatient follow up with [facility] per Neuro recommendations     HTN  - recently had increase in losartan dose to 100 mg and

## 2024-07-30 NOTE — DISCHARGE SUMMARY
Discharge Summary       PATIENT ID: Dwayne Ceja  MRN: [id number]   YOB: 1945    DATE OF ADMISSION: 7/28/2024  5:19 PM    DATE OF DISCHARGE: 7/30/2024   PRIMARY CARE PROVIDER: Danielle Onofre MD     ATTENDING PHYSICIAN: Dr Prashanth Spicer  DISCHARGING PROVIDER: Prashanth Spicer MD    To contact this individual call [phone] and ask the  to page.  If unavailable ask to be transferred the Adult Hospitalist Department.    CONSULTATIONS: IP CONSULT TO HOSPITALIST  IP CONSULT TO NEUROLOGY    PROCEDURES/SURGERIES: * No surgery found *    ADMITTING DIAGNOSES & HOSPITAL COURSE:   Lightheadedness  Confusion, resolved  - orthostatics negative and VSS  - head CT: no acute process   - MRA head/neck: two 2 mm outpouchings of cavernous right ICA, which may represent small aneurysms, infundibula, or atherosclerotic irregularity  - A1c 5.7  - total cholesterol 121, LDL 52.4  - evaluated by Neurology: no indication for brain MRI, recommend outpatient sleep study for possible STEVE  - echo: EF 60-65%, severe annular calcification at posterior leaflet of mitral valve - mild regurgitation and no stenosis  - PT/OT following    2 mm outpouchings of cavernous right ICA  - as noted on head MRA  - outpatient follow up with [facility] per Neuro recommendations     HTN  - recently had increase in losartan dose to 100 mg and started on 30 mg nifedipine daily by PCP due to marked elevation in BP  - BP stable, not orthostatic  - Will increase losartan to 100 daily at discharge     HLD  - total cholesterol 121, LDL 52.4  - continue home 10 mg atorvastatin    Concern for sleep apnea  - recommend outpatient sleep apnea evaluation      PENDING TEST RESULTS:   At the time of discharge the following test results are still pending: none    FOLLOW UP APPOINTMENTS:    PCP  Cardiology  [facility]    ADDITIONAL CARE RECOMMENDATIONS:   BP twice daily and maintain a diary to show it to your PMD  If bp>180/100 please call your PMD  Outpatient sleep